# Patient Record
Sex: FEMALE | Race: WHITE | NOT HISPANIC OR LATINO | Employment: UNEMPLOYED | URBAN - METROPOLITAN AREA
[De-identification: names, ages, dates, MRNs, and addresses within clinical notes are randomized per-mention and may not be internally consistent; named-entity substitution may affect disease eponyms.]

---

## 2020-01-02 ENCOUNTER — TELEPHONE (OUTPATIENT)
Dept: GASTROENTEROLOGY | Facility: CLINIC | Age: 50
End: 2020-01-02

## 2020-01-02 NOTE — LETTER
January 2, 2020    Lisa RICHARDS Vkiy  [address] [place] [address]             [facility]  [address], [address]   [facility] [address]  Phone: [phone]  Fax: [phone] Dear Ms. Salmon:    We have attempted to contact you to schedule a screening colonoscopy that was ordered by your doctor. Please contact the office to schedule at [phone].      If you have any questions or concerns, please don't hesitate to call.    Sincerely,        Valeria Kaba MA

## 2020-01-27 ENCOUNTER — OFFICE VISIT (OUTPATIENT)
Dept: ORTHOPEDICS | Facility: CLINIC | Age: 50
End: 2020-01-27
Payer: MEDICAID

## 2020-01-27 VITALS
HEIGHT: 67 IN | DIASTOLIC BLOOD PRESSURE: 106 MMHG | WEIGHT: 293 LBS | SYSTOLIC BLOOD PRESSURE: 146 MMHG | BODY MASS INDEX: 45.99 KG/M2

## 2020-01-27 DIAGNOSIS — M25.562 BILATERAL CHRONIC KNEE PAIN: Primary | ICD-10-CM

## 2020-01-27 DIAGNOSIS — M25.561 BILATERAL CHRONIC KNEE PAIN: Primary | ICD-10-CM

## 2020-01-27 DIAGNOSIS — Z87.39 PERSONAL HISTORY OF RHEUMATOID ARTHRITIS: ICD-10-CM

## 2020-01-27 DIAGNOSIS — M25.562 LEFT KNEE PAIN: Primary | ICD-10-CM

## 2020-01-27 DIAGNOSIS — M17.0 OSTEOARTHRITIS OF BOTH KNEES, UNSPECIFIED OSTEOARTHRITIS TYPE: ICD-10-CM

## 2020-01-27 DIAGNOSIS — G89.29 BILATERAL CHRONIC KNEE PAIN: Primary | ICD-10-CM

## 2020-01-27 PROCEDURE — 99999 PR PBB SHADOW E&M-EST. PATIENT-LVL II: ICD-10-PCS | Mod: PBBFAC,,, | Performed by: ORTHOPAEDIC SURGERY

## 2020-01-27 PROCEDURE — 99999 PR PBB SHADOW E&M-EST. PATIENT-LVL II: CPT | Mod: PBBFAC,,, | Performed by: ORTHOPAEDIC SURGERY

## 2020-01-27 PROCEDURE — 99204 OFFICE O/P NEW MOD 45 MIN: CPT | Mod: S$PBB,,, | Performed by: ORTHOPAEDIC SURGERY

## 2020-01-27 PROCEDURE — 99204 PR OFFICE/OUTPT VISIT, NEW, LEVL IV, 45-59 MIN: ICD-10-PCS | Mod: S$PBB,,, | Performed by: ORTHOPAEDIC SURGERY

## 2020-01-27 PROCEDURE — 99212 OFFICE O/P EST SF 10 MIN: CPT | Mod: PBBFAC,PN | Performed by: ORTHOPAEDIC SURGERY

## 2020-01-27 RX ORDER — DICLOFENAC SODIUM 75 MG/1
75 TABLET, DELAYED RELEASE ORAL 2 TIMES DAILY
Qty: 60 TABLET | Refills: 0 | Status: SHIPPED | OUTPATIENT
Start: 2020-01-27 | End: 2022-03-17

## 2020-01-27 NOTE — PROGRESS NOTES
CC: left knee pain    49 y.o. Female presents today for evaluation of her left knee pain. Patient reports she fell approximately three months ago. Patient states she was giving her bird water when it bit her causing her to spill the water. Patient states she then slipped on the water with her right leg and all of her weight went to her to left knee. Patient states she fell forward onto her knee and reports immediate and significant pain. Patient states she did not immediately seek care for this problem, but does admit to going to the [facility]. She states she had x-rays performed and these did not show any bony deformity. Patient admits to this problem waking her up from her sleep. When asked where her pain is located she gestures to the anterior aspect of her knee and reports the quality of her pain feels as if there is a grinding. Patient reports she is in the bariatric program at [facility] and has approximately 3-4 months until her surgery. Patient denies falls or trauma since her last visit.   How long: Patient reports she has been experiencing left knee pain for the past couple of years.   Symptoms recently worsened and have been intense for the last 3 months after the fall directly onto her knee.  What makes it better: Patient states she has been unable to find anything to improve her pain at this time.   What makes it worse: Patient reports increased pain sitting for a long period of time and standing for long periods of time.  Does it radiate: Denies radiating pain.   Attempted treatments: Patient has been taking Neurontin and flexeril regularly without any improvement. She has also tried multiple over-the-counter anti-inflammatories improvement.  Pain score: 8/10  Any mechanical symptoms: Patient reports she has been experiencing painful popping.   Feelings of instability: Patient confirms feelings of instability.   Affect on ADLs: Patient confirms this problem is affecting her ability to perform  ADLs.      REVIEW OF SYSTEMS:   Constitution: Patient denies fever, chills, night sweats, and weight changes.  Eyes: Patient denies eye pain or vision changes.  HENT: Patient denies headache, ear pain, sore throat, or nasal discharge.  CVS: Patient denies chest pain.  Lungs: Patient denies shortness of breath or cough.  Abd: Patient denies stomach pain, nausea, or vomiting.  Skin: Patient denies skin rash or itching.    Hematologic/Lymphatic: Patient denies easy bruising.   Musculoskeletal: Patient denies recent falls. See HPI.  Psych: Patient denies any current anxiety or nervousness.    PAST MEDICAL HISTORY:   Past Medical History:   Diagnosis Date    Arthritis     High cholesterol     Hypertension     Hyperthyroidism        PAST SURGICAL HISTORY:   Past Surgical History:   Procedure Laterality Date    CHOLECYSTECTOMY      HYSTERECTOMY         FAMILY HISTORY:   History reviewed. No pertinent family history.    SOCIAL HISTORY:   Social History     Socioeconomic History    Marital status:      Spouse name: Not on file    Number of children: Not on file    Years of education: Not on file    Highest education level: Not on file   Occupational History    Not on file   Social Needs    Financial resource strain: Not on file    Food insecurity:     Worry: Not on file     Inability: Not on file    Transportation needs:     Medical: Not on file     Non-medical: Not on file   Tobacco Use    Smoking status: Never Smoker   Substance and Sexual Activity    Alcohol use: No    Drug use: No    Sexual activity: Yes     Birth control/protection: Surgical   Lifestyle    Physical activity:     Days per week: Not on file     Minutes per session: Not on file    Stress: Not on file   Relationships    Social connections:     Talks on phone: Not on file     Gets together: Not on file     Attends Mandaeism service: Not on file     Active member of club or organization: Not on file     Attends meetings of clubs or  "organizations: Not on file     Relationship status: Not on file   Other Topics Concern    Not on file   Social History Narrative    Not on file       MEDICATIONS:     Current Outpatient Medications:     amlodipine (NORVASC) 5 MG tablet, Take 10 mg by mouth once daily. , Disp: , Rfl:     atorvastatin (LIPITOR) 40 MG tablet, Take 40 mg by mouth once daily., Disp: , Rfl:     buPROPion (WELLBUTRIN XL) 300 MG 24 hr tablet, Take 300 mg by mouth once daily., Disp: , Rfl:     citalopram (CELEXA) 10 MG tablet, Take 60 mg by mouth once daily., Disp: , Rfl:     diclofenac (VOLTAREN) 75 MG EC tablet, Take 1 tablet (75 mg total) by mouth 2 (two) times daily., Disp: 60 tablet, Rfl: 0    estradiol (ESTRING) 2 mg vaginal ring, Take 2 mg by mouth once daily. , Disp: , Rfl:     levothyroxine (SYNTHROID) 50 MCG tablet, Take 50 mcg by mouth once daily., Disp: , Rfl:     oxycodone-acetaminophen (PERCOCET)  mg per tablet, Take 1 tablet by mouth every 4 (four) hours as needed for Pain., Disp: 20 tablet, Rfl: 0    ALLERGIES:   Review of patient's allergies indicates:  No Known Allergies     PHYSICAL EXAMINATION:  BP (!) 146/106   Ht 5' 7" (1.702 m)   Wt (!) 155.6 kg (343 lb)   BMI 53.72 kg/m²   Vitals signs and nursing note have been reviewed.  General: In no acute distress, well developed, well nourished, no diaphoresis  Eyes: EOM full and smooth, no eye redness or discharge  HENT: normocephalic and atraumatic, neck supple, trachea midline, no nasal discharge, no external ear redness or discharge  Cardiovascular: 2+ and symmetric DP pulses bilaterally, no LE edema  Lungs: respirations non-labored, no conversational dyspnea   Abd: non-distended, no rigidity  MSK: no amputation or deformity, no swelling of extremities  Neuro: AAOx3, CN2-12 grossly intact  Skin: No rashes, warm and dry  Psychiatric: cooperative, pleasant, mood and affect appropriate for age    MUSCULOSKELETAL EXAM:    LEFT KNEE EXAMINATION   Affected side " is compared to contralateral knee     Observation:  No edema, erythema, ecchymosis, or effusion noted.  No muscle atrophy of the thighs and calves noted.  No obvious bony deformities noted.   Genu varum, left more pronounced than right. No recurvatum noted.    No tibial internal/external torsion.      Tenderness:  Patella - present   Lateral joint line - present  Quad tendon - none   Medial joint line - present  Patellar tendon - none   Medial plica - none  Tibial tubercle - none   Lateral plica - none  Pes anserine - none   MCL prox - none  Distal ITB - none   MCL distal - none  MFC - none    LCL prox - none  LFC - none    LCL distal - none  Tibia - none    Fibula - none    No obvious bursae, plicae, popliteal cysts, or tendon derangement palpated.          ROM:   Active extension to 0° on left without hyperextension, lag, or patellar J sign. Crepitus present with extension.   Active extension to 0° on right without hyperextension, lag, crepitus, or patellar J sign.   Active flexion to 135° on left and 135° on right.    Strength: (bilaterally)  Knee Flexion - 5/5 on left and 5/5 on right  Knee Extension - 5/5 on left and 5/5 on right  Hip Flexion - 5/5 on left and 5/5 on right  Hip Extension - 5/5 on left and 5/5 on right  Ankle dorsiflexion - 5/5 on left and 5/5 on right  Ankle Plantarflexion - 5/5 on left and 5/5 on right    Patellofemoral Exam:  Patellar ballottement - negative  Bulge sign - negative  Patellar grind - negative  No patellar laxity with medial and lateral translation   No apprehension with medial and lateral patellar translation.     Meniscus Testing:     Pain present with forced flexion and terminal extension on the left.  Jamess test - Negative for clicks, positive for pain  Bounce home test - negative    Ligament Testing:  Lachman's test - negative  No laxity with varus testing at 0 and 30 degrees.  No laxity with valgus testing at 0 and 30 degrees.    IT Band Testing:  Dels test -  negative   Noble Compression test - negative    Neurovascular Examination:    antalgic gait favoring the left leg  Sensation intact to light touch in the obturator, lateral/intermediate/medial/posterior femoral cutaneous, saphenous, and common peroneal nerves bilaterally.  Pulses intact at the DP and PT arteries bilaterally.    Capillary refill intact <2 seconds in all toes bilaterally.      IMAGIN. X-ray ordered due to left knee pain  2. X-ray images were reviewed personally by me and then directly with patient.  3. FINDINGS: X-ray images obtained demonstrate moderate right-sided and severe left-sided medial compartment joint space loss.  There is no fracture, dislocation, or bony erosion.  4. IMPRESSION: As above.      ASSESSMENT:      ICD-10-CM ICD-9-CM   1. Bilateral chronic knee pain M25.561 719.46    M25.562 338.29    G89.29    2. Osteoarthritis of both knees, unspecified osteoarthritis type M17.0 715.96   3. Personal history of rheumatoid arthritis Z87.39 V13.4       PLAN:  1-3. Bilateral knee pain/ osteoarthritis/ personal history of RA -     - Lisa admits to bilateral knee pain for years, but states that her left knee pain became intense approximately 3 months ago after a fall directly onto it.  She was seen in the emergency department shortly after and a fracture was ruled out.  She states that her pain is not improved even with taking multiple different anti-inflammatory medications.  She is currently taking Flexeril and Neurontin regularly with minimal improvement.    - XRs ordered in the office today and images were personally reviewed with the patient. See above for further detail.    - We reviewed the natural history of osteoarthritis and a multipronged treatment approach. We reviewed the importance of addressing three different aspects to best manage this condition. Controlling the intra-articular immune reaction through medications and/or injections, improving local stability through bracing  and/or injection, and improving functional stability through hip, core, and ankle strength, stability and mobility which may benefit from formal physical therapy.    - Discussed pptions today, including intra-articular CSI, oral anti-inflammatories, exercises, braces and she likes to proceed with medications and exercises.    - HEP for knee OA prescribed today. Handout provided, explained, and exercises were demonstrated as needed. Encouraged to do daily.    - Voltaren 75 mg b.i.d. For 2 weeks followed by as needed.      Future planning includes -   Formal bracing, intra-articular CSI (has not yet tried either), PT    All questions were answered to the best of my ability and all concerns were addressed at this time.    Follow up in 4-5 weeks for above, or sooner if needed.      This note is dictated using the M*Modal Fluency Direct word recognition program. There are word recognition mistakes that are occasionally missed on review.

## 2020-01-27 NOTE — LETTER
January 27, 2020      Mp Howard NP  [address]  [address]  [facility] [address]           [facility]  [address], [facility] [address]  [facility] [address]  Phone: [phone]  Fax: [phone]          Patient: Lisa Salmon   MR Number: [id number]   YOB: 1970   Date of Visit: 1/27/2020       Dear Mp Howard:    Thank you for referring Lisa Salmon to me for evaluation. Attached you will find relevant portions of my assessment and plan of care.    If you have questions, please do not hesitate to call me. I look forward to following Lisa Salmon along with you.    Sincerely,    Guillremo Guerin, DO    Enclosure  CC:  No Recipients    If you would like to receive this communication electronically, please contact [email or web address] or [phone] to request more information on [employer] access.    For providers and/or their staff who would like to refer a patient to [facility], please contact us through our one-stop-shop provider referral line, [facility], at [phone].    If you feel you have received this communication in error or would no longer like to receive these types of communications, please e-mail [email or web address]

## 2020-05-04 ENCOUNTER — TELEPHONE (OUTPATIENT)
Dept: ORTHOPEDICS | Facility: CLINIC | Age: 50
End: 2020-05-04

## 2020-05-04 NOTE — TELEPHONE ENCOUNTER
Contacted patient to see how here knee pain has been progressing over the past several weeks. Patient states her pain has not improved and she is interested in possibly having an MRI scheduled. I reported to the patient that we are now able to start scheduling patients for in-person clinic visits. Patient reports she would like to be scheduled in 6 weeks. I was able to schedule the patient for an appointment date and time she was in agreement to.    [phone]  Brenda Chilel MS, OTC  Clinical Assistant to Dr. Guillermo Guerin

## 2020-05-20 ENCOUNTER — TELEPHONE (OUTPATIENT)
Dept: GASTROENTEROLOGY | Facility: CLINIC | Age: 50
End: 2020-05-20

## 2020-06-15 ENCOUNTER — NURSE TRIAGE (OUTPATIENT)
Dept: ADMINISTRATIVE | Facility: CLINIC | Age: 50
End: 2020-06-15

## 2020-06-16 NOTE — TELEPHONE ENCOUNTER
Reason for Disposition   [1] Symptoms of COVID-19 (e.g., cough, fever, SOB, or others) AND [2] lives in an area with community spread   [1] COVID-19 infection suspected by caller or triager AND [2] mild symptoms (cough, fever, or others) AND [3] no complications or SOB    Additional Information   Negative: SEVERE difficulty breathing (e.g., struggling for each breath, speaks in single words)   Negative: Difficult to awaken or acting confused (e.g., disoriented, slurred speech)   Negative: Bluish (or gray) lips or face now   Negative: Shock suspected (e.g., cold/pale/clammy skin, too weak to stand, low BP, rapid pulse)   Negative: Sounds like a life-threatening emergency to the triager   Negative: SEVERE or constant chest pain or pressure (Exception: mild central chest pain, present only when coughing)   Negative: MODERATE difficulty breathing (e.g., speaks in phrases, SOB even at rest, pulse 100-120)   Negative: MILD difficulty breathing (e.g., minimal/no SOB at rest, SOB with walking, pulse <100)   Negative: Chest pain or pressure   Negative: Fever > 103 F (39.4 C)   Negative: [1] Fever > 101 F (38.3 C) AND [2] age > 60   Negative: [1] Fever > 100.0 F (37.8 C) AND [2] bedridden (e.g., nursing home patient, CVA, chronic illness, recovering from surgery)   Negative: HIGH RISK patient (e.g., age > 64 years, diabetes, heart or lung disease, weak immune system)   Negative: Fever present > 3 days (72 hours)   Negative: [1] Fever returns after gone for over 24 hours AND [2] symptoms worse or not improved   Negative: [1] Continuous (nonstop) coughing interferes with work or school AND [2] no improvement using cough treatment per protocol    Protocols used: CORONAVIRUS (COVID-19) EXPOSURE-A-AH, CORONAVIRUS (COVID-19) DIAGNOSED OR [id number]    Pt stated she has symptoms of COVID and wants to be tested. Reports low grade temp,cough, chills and loss of smell. Pt advised to see a Physician when office  opens. Advised of COVID testing site locations. Advised Provider at [facility] determines if a test will be administered. Pt verbalized understanding.

## 2021-05-07 ENCOUNTER — HOSPITAL ENCOUNTER (EMERGENCY)
Facility: HOSPITAL | Age: 51
Discharge: HOME | End: 2021-05-07
Attending: EMERGENCY MEDICINE
Payer: MEDICAID

## 2021-05-07 VITALS
SYSTOLIC BLOOD PRESSURE: 127 MMHG | OXYGEN SATURATION: 100 % | RESPIRATION RATE: 22 BRPM | WEIGHT: 293 LBS | DIASTOLIC BLOOD PRESSURE: 80 MMHG | BODY MASS INDEX: 45.99 KG/M2 | TEMPERATURE: 99 F | HEIGHT: 67 IN | HEART RATE: 98 BPM

## 2021-05-07 DIAGNOSIS — R53.83 FATIGUE, UNSPECIFIED TYPE: ICD-10-CM

## 2021-05-07 DIAGNOSIS — R74.01 TRANSAMINITIS: Primary | ICD-10-CM

## 2021-05-07 DIAGNOSIS — B34.9 VIRAL SYNDROME: ICD-10-CM

## 2021-05-07 LAB
ALBUMIN SERPL BCP-MCNC: 3.1 G/DL (ref 3.5–5.2)
ALP SERPL-CCNC: 260 U/L (ref 55–135)
ALT SERPL W/O P-5'-P-CCNC: 71 U/L (ref 10–44)
ANION GAP SERPL CALC-SCNC: 11 MMOL/L (ref 8–16)
APAP SERPL-MCNC: <3 UG/ML (ref 10–20)
AST SERPL-CCNC: 68 U/L (ref 10–40)
BILIRUB SERPL-MCNC: 0.7 MG/DL (ref 0.1–1)
BUN SERPL-MCNC: 12 MG/DL (ref 6–20)
CALCIUM SERPL-MCNC: 8 MG/DL (ref 8.7–10.5)
CHLORIDE SERPL-SCNC: 104 MMOL/L (ref 95–110)
CO2 SERPL-SCNC: 25 MMOL/L (ref 23–29)
CREAT SERPL-MCNC: 1.1 MG/DL (ref 0.5–1.4)
CTP QC/QA: YES
ERYTHROCYTE [DISTWIDTH] IN BLOOD BY AUTOMATED COUNT: 14.6 % (ref 11.5–14.5)
EST. GFR  (AFRICAN AMERICAN): >60 ML/MIN/1.73 M^2
EST. GFR  (NON AFRICAN AMERICAN): 58 ML/MIN/1.73 M^2
GLUCOSE SERPL-MCNC: 96 MG/DL (ref 70–110)
HCT VFR BLD AUTO: 39.4 % (ref 37–48.5)
HETEROPH AB SERPL QL IA: NEGATIVE
HGB BLD-MCNC: 13.3 G/DL (ref 12–16)
INR PPP: 1 (ref 0.8–1.2)
MCH RBC QN AUTO: 29.8 PG (ref 27–31)
MCHC RBC AUTO-ENTMCNC: 33.8 G/DL (ref 32–36)
MCV RBC AUTO: 88 FL (ref 82–98)
PLATELET # BLD AUTO: 169 K/UL (ref 150–450)
PMV BLD AUTO: 9.5 FL (ref 9.2–12.9)
POC MOLECULAR INFLUENZA A AGN: NEGATIVE
POC MOLECULAR INFLUENZA B AGN: NEGATIVE
POTASSIUM SERPL-SCNC: 3.8 MMOL/L (ref 3.5–5.1)
PROT SERPL-MCNC: 6.4 G/DL (ref 6–8.4)
PROTHROMBIN TIME: 10.7 SEC (ref 9–12.5)
RBC # BLD AUTO: 4.47 M/UL (ref 4–5.4)
SODIUM SERPL-SCNC: 140 MMOL/L (ref 136–145)
WBC # BLD AUTO: 7.32 K/UL (ref 3.9–12.7)

## 2021-05-07 PROCEDURE — 85027 COMPLETE CBC AUTOMATED: CPT | Performed by: EMERGENCY MEDICINE

## 2021-05-07 PROCEDURE — 86308 HETEROPHILE ANTIBODY SCREEN: CPT | Performed by: EMERGENCY MEDICINE

## 2021-05-07 PROCEDURE — 99284 EMERGENCY DEPT VISIT MOD MDM: CPT | Mod: 25

## 2021-05-07 PROCEDURE — 80053 COMPREHEN METABOLIC PANEL: CPT | Performed by: EMERGENCY MEDICINE

## 2021-05-07 PROCEDURE — 80074 ACUTE HEPATITIS PANEL: CPT | Performed by: EMERGENCY MEDICINE

## 2021-05-07 PROCEDURE — 80143 DRUG ASSAY ACETAMINOPHEN: CPT | Performed by: EMERGENCY MEDICINE

## 2021-05-07 PROCEDURE — 85610 PROTHROMBIN TIME: CPT | Performed by: EMERGENCY MEDICINE

## 2021-05-10 LAB
HAV IGM SERPL QL IA: NEGATIVE
HBV CORE IGM SERPL QL IA: NEGATIVE
HBV SURFACE AG SERPL QL IA: NEGATIVE
HCV AB SERPL QL IA: NEGATIVE

## 2021-05-23 ENCOUNTER — HOSPITAL ENCOUNTER (EMERGENCY)
Facility: HOSPITAL | Age: 51
Discharge: HOME | End: 2021-05-23
Attending: EMERGENCY MEDICINE
Payer: MEDICAID

## 2021-05-23 VITALS
HEART RATE: 92 BPM | OXYGEN SATURATION: 96 % | DIASTOLIC BLOOD PRESSURE: 87 MMHG | SYSTOLIC BLOOD PRESSURE: 140 MMHG | RESPIRATION RATE: 18 BRPM | TEMPERATURE: 98 F

## 2021-05-23 DIAGNOSIS — R11.2 NAUSEA & VOMITING: Primary | ICD-10-CM

## 2021-05-23 DIAGNOSIS — K13.0 ANGULAR CHEILITIS: ICD-10-CM

## 2021-05-23 LAB
ALBUMIN SERPL BCP-MCNC: 2.8 G/DL (ref 3.5–5.2)
ALP SERPL-CCNC: 217 U/L (ref 55–135)
ALT SERPL W/O P-5'-P-CCNC: 72 U/L (ref 10–44)
ANION GAP SERPL CALC-SCNC: 11 MMOL/L (ref 8–16)
AST SERPL-CCNC: 50 U/L (ref 10–40)
BASOPHILS NFR BLD: 0 % (ref 0–1.9)
BILIRUB SERPL-MCNC: 0.4 MG/DL (ref 0.1–1)
BUN SERPL-MCNC: 13 MG/DL (ref 6–20)
CALCIUM SERPL-MCNC: 8 MG/DL (ref 8.7–10.5)
CHLORIDE SERPL-SCNC: 107 MMOL/L (ref 95–110)
CO2 SERPL-SCNC: 17 MMOL/L (ref 23–29)
CREAT SERPL-MCNC: 1.1 MG/DL (ref 0.5–1.4)
DIFFERENTIAL METHOD: ABNORMAL
EOSINOPHIL NFR BLD: 2 % (ref 0–8)
ERYTHROCYTE [DISTWIDTH] IN BLOOD BY AUTOMATED COUNT: 16.1 % (ref 11.5–14.5)
EST. GFR  (AFRICAN AMERICAN): >60 ML/MIN/1.73 M^2
EST. GFR  (NON AFRICAN AMERICAN): 58.3 ML/MIN/1.73 M^2
GLUCOSE SERPL-MCNC: 139 MG/DL (ref 70–110)
HCT VFR BLD AUTO: 43.3 % (ref 37–48.5)
HGB BLD-MCNC: 12.9 G/DL (ref 12–16)
IMM GRANULOCYTES # BLD AUTO: ABNORMAL K/UL (ref 0–0.04)
IMM GRANULOCYTES NFR BLD AUTO: ABNORMAL % (ref 0–0.5)
LIPASE SERPL-CCNC: 33 U/L (ref 4–60)
LYMPHOCYTES NFR BLD: 31 % (ref 18–48)
MCH RBC QN AUTO: 28.5 PG (ref 27–31)
MCHC RBC AUTO-ENTMCNC: 29.8 G/DL (ref 32–36)
MCV RBC AUTO: 96 FL (ref 82–98)
MONOCYTES NFR BLD: 10 % (ref 4–15)
NEUTROPHILS NFR BLD: 55 % (ref 38–73)
NEUTS BAND NFR BLD MANUAL: 2 %
NRBC BLD-RTO: 0 /100 WBC
PLATELET # BLD AUTO: 267 K/UL (ref 150–450)
PLATELET BLD QL SMEAR: ABNORMAL
PMV BLD AUTO: 9.2 FL (ref 9.2–12.9)
POTASSIUM SERPL-SCNC: 4.3 MMOL/L (ref 3.5–5.1)
PROT SERPL-MCNC: 6.8 G/DL (ref 6–8.4)
RBC # BLD AUTO: 4.52 M/UL (ref 4–5.4)
SODIUM SERPL-SCNC: 135 MMOL/L (ref 136–145)
WBC # BLD AUTO: 14.56 K/UL (ref 3.9–12.7)

## 2021-05-23 PROCEDURE — 85060 BLOOD SMEAR INTERPRETATION: CPT | Mod: ,,, | Performed by: PATHOLOGY

## 2021-05-23 PROCEDURE — 99284 EMERGENCY DEPT VISIT MOD MDM: CPT | Mod: ,,, | Performed by: EMERGENCY MEDICINE

## 2021-05-23 PROCEDURE — 80053 COMPREHEN METABOLIC PANEL: CPT | Performed by: STUDENT IN AN ORGANIZED HEALTH CARE EDUCATION/TRAINING PROGRAM

## 2021-05-23 PROCEDURE — 99284 EMERGENCY DEPT VISIT MOD MDM: CPT | Mod: 25

## 2021-05-23 PROCEDURE — 96360 HYDRATION IV INFUSION INIT: CPT

## 2021-05-23 PROCEDURE — 85007 BL SMEAR W/DIFF WBC COUNT: CPT | Performed by: STUDENT IN AN ORGANIZED HEALTH CARE EDUCATION/TRAINING PROGRAM

## 2021-05-23 PROCEDURE — 93010 EKG 12-LEAD: ICD-10-PCS | Mod: ,,, | Performed by: INTERNAL MEDICINE

## 2021-05-23 PROCEDURE — 93005 ELECTROCARDIOGRAM TRACING: CPT

## 2021-05-23 PROCEDURE — 93010 ELECTROCARDIOGRAM REPORT: CPT | Mod: ,,, | Performed by: INTERNAL MEDICINE

## 2021-05-23 PROCEDURE — 85060 PATHOLOGIST REVIEW: ICD-10-PCS | Mod: ,,, | Performed by: PATHOLOGY

## 2021-05-23 PROCEDURE — 25000003 PHARM REV CODE 250: Performed by: STUDENT IN AN ORGANIZED HEALTH CARE EDUCATION/TRAINING PROGRAM

## 2021-05-23 PROCEDURE — 85027 COMPLETE CBC AUTOMATED: CPT | Performed by: STUDENT IN AN ORGANIZED HEALTH CARE EDUCATION/TRAINING PROGRAM

## 2021-05-23 PROCEDURE — 96372 THER/PROPH/DIAG INJ SC/IM: CPT | Mod: 59

## 2021-05-23 PROCEDURE — 99284 PR EMERGENCY DEPT VISIT,LEVEL IV: ICD-10-PCS | Mod: ,,, | Performed by: EMERGENCY MEDICINE

## 2021-05-23 PROCEDURE — 63600175 PHARM REV CODE 636 W HCPCS: Performed by: STUDENT IN AN ORGANIZED HEALTH CARE EDUCATION/TRAINING PROGRAM

## 2021-05-23 PROCEDURE — 83690 ASSAY OF LIPASE: CPT | Performed by: STUDENT IN AN ORGANIZED HEALTH CARE EDUCATION/TRAINING PROGRAM

## 2021-05-23 RX ORDER — THIAMINE HYDROCHLORIDE 100 MG/ML
100 INJECTION, SOLUTION INTRAMUSCULAR; INTRAVENOUS
Status: COMPLETED | OUTPATIENT
Start: 2021-05-23 | End: 2021-05-23

## 2021-05-23 RX ADMIN — THIAMINE HYDROCHLORIDE 100 MG: 100 INJECTION, SOLUTION INTRAMUSCULAR; INTRAVENOUS at 04:05

## 2021-05-23 RX ADMIN — SODIUM CHLORIDE 1000 ML: 0.9 INJECTION, SOLUTION INTRAVENOUS at 04:05

## 2021-05-24 ENCOUNTER — TELEPHONE (OUTPATIENT)
Dept: BARIATRICS | Facility: CLINIC | Age: 51
End: 2021-05-24

## 2021-05-24 LAB — PATH REV BLD -IMP: NORMAL

## 2022-03-16 DIAGNOSIS — M25.562 LEFT KNEE PAIN, UNSPECIFIED CHRONICITY: Primary | ICD-10-CM

## 2022-03-17 ENCOUNTER — OFFICE VISIT (OUTPATIENT)
Dept: ORTHOPEDICS | Facility: CLINIC | Age: 52
End: 2022-03-17
Payer: MEDICAID

## 2022-03-17 DIAGNOSIS — M17.12 PRIMARY OSTEOARTHRITIS OF LEFT KNEE: Primary | ICD-10-CM

## 2022-03-17 DIAGNOSIS — M25.569 KNEE PAIN, UNSPECIFIED CHRONICITY, UNSPECIFIED LATERALITY: Primary | ICD-10-CM

## 2022-03-17 PROCEDURE — 99999 PR PBB SHADOW E&M-EST. PATIENT-LVL III: CPT | Mod: PBBFAC,,, | Performed by: PHYSICIAN ASSISTANT

## 2022-03-17 PROCEDURE — 99213 OFFICE O/P EST LOW 20 MIN: CPT | Mod: PBBFAC,PN,25 | Performed by: PHYSICIAN ASSISTANT

## 2022-03-17 PROCEDURE — 99213 OFFICE O/P EST LOW 20 MIN: CPT | Mod: 25,S$PBB,, | Performed by: PHYSICIAN ASSISTANT

## 2022-03-17 PROCEDURE — 99999 PR PBB SHADOW E&M-EST. PATIENT-LVL III: ICD-10-PCS | Mod: PBBFAC,,, | Performed by: PHYSICIAN ASSISTANT

## 2022-03-17 PROCEDURE — 1160F RVW MEDS BY RX/DR IN RCRD: CPT | Mod: CPTII,,, | Performed by: PHYSICIAN ASSISTANT

## 2022-03-17 PROCEDURE — 20610 DRAIN/INJ JOINT/BURSA W/O US: CPT | Mod: S$PBB,LT,, | Performed by: PHYSICIAN ASSISTANT

## 2022-03-17 PROCEDURE — 1160F PR REVIEW ALL MEDS BY PRESCRIBER/CLIN PHARMACIST DOCUMENTED: ICD-10-PCS | Mod: CPTII,,, | Performed by: PHYSICIAN ASSISTANT

## 2022-03-17 PROCEDURE — 20610 DRAIN/INJ JOINT/BURSA W/O US: CPT | Mod: PBBFAC,PN | Performed by: PHYSICIAN ASSISTANT

## 2022-03-17 PROCEDURE — 20610 LARGE JOINT ASPIRATION/INJECTION: L KNEE: ICD-10-PCS | Mod: S$PBB,LT,, | Performed by: PHYSICIAN ASSISTANT

## 2022-03-17 PROCEDURE — 1159F PR MEDICATION LIST DOCUMENTED IN MEDICAL RECORD: ICD-10-PCS | Mod: CPTII,,, | Performed by: PHYSICIAN ASSISTANT

## 2022-03-17 PROCEDURE — 99213 PR OFFICE/OUTPT VISIT, EST, LEVL III, 20-29 MIN: ICD-10-PCS | Mod: 25,S$PBB,, | Performed by: PHYSICIAN ASSISTANT

## 2022-03-17 PROCEDURE — 1159F MED LIST DOCD IN RCRD: CPT | Mod: CPTII,,, | Performed by: PHYSICIAN ASSISTANT

## 2022-03-17 RX ORDER — TRIAMCINOLONE ACETONIDE 40 MG/ML
40 INJECTION, SUSPENSION INTRA-ARTICULAR; INTRAMUSCULAR
Status: DISCONTINUED | OUTPATIENT
Start: 2022-03-17 | End: 2022-03-17 | Stop reason: HOSPADM

## 2022-03-17 RX ORDER — ERGOCALCIFEROL 1.25 MG/1
50000 CAPSULE ORAL
COMMUNITY
Start: 2022-03-10

## 2022-03-17 RX ORDER — CLONAZEPAM 1 MG/1
1 TABLET ORAL 3 TIMES DAILY PRN
COMMUNITY
Start: 2022-02-17

## 2022-03-17 RX ORDER — LISDEXAMFETAMINE DIMESYLATE 40 MG/1
1 CAPSULE ORAL DAILY
COMMUNITY
Start: 2022-01-19 | End: 2023-05-23

## 2022-03-17 RX ORDER — GABAPENTIN 400 MG/1
400 CAPSULE ORAL 3 TIMES DAILY
COMMUNITY
End: 2023-08-01

## 2022-03-17 RX ORDER — FERROUS SULFATE TAB 325 MG (65 MG ELEMENTAL FE) 325 (65 FE) MG
TAB ORAL DAILY
COMMUNITY
Start: 2022-03-10 | End: 2022-04-06

## 2022-03-17 RX ORDER — ARIPIPRAZOLE 15 MG/1
15 TABLET ORAL DAILY
COMMUNITY
Start: 2022-02-18

## 2022-03-17 RX ORDER — FOLIC ACID 1 MG/1
1000 TABLET ORAL DAILY
COMMUNITY
Start: 2022-03-03

## 2022-03-17 RX ADMIN — TRIAMCINOLONE ACETONIDE 40 MG: 40 INJECTION, SUSPENSION INTRA-ARTICULAR; INTRAMUSCULAR at 08:03

## 2022-03-17 NOTE — PATIENT INSTRUCTIONS
It was nice to meet you today! I am sorry that you are hurting so much.   You have some wear and tear in your left knee (arthritis) and this is likely what is causing your pain.   The injection that I did today should give you some good relief of pain. It is normal to have some increased soreness over the next few days after an injection. Put ice on it and elevate. This will get better.   Wear the knee brace as needed for prolonged walking. This should give you added support and help with pain.   Work on weight loss- this will help with knee pain.  I will see you back in 3 months, but please stay in touch and call me if you need anything. You can also send me a message in [facility].   Ross[place]   [phone]       Patient Education       Preventing Falls in Adult   About this topic   A fall is the sudden loss of balance that causes a person to drop to the ground or floor. Falls are a serious health risk and they happen more often as we get older. Many things may increase your risk of falling, like:  Problems that come with getting older  Muscle weakness  Balance problems  More trouble seeing  Personal health factors  Health conditions such as arthritis, Parkinson's disease, low blood pressure, or stroke  Medicines you take  Loss of feeling in your feet  Being less active  Taking drugs that makes you dizzy or drowsy  Habits like alcohol use  Things around your house  Slippery floors  Unsecured rugs  Stairs  Wearing improper fitting shoes  Areas where it is dark and difficult to see  Incorrect size or type of assistive devices  Clutter and items on the floor that block your walkway     What will the results be?   Prevent future falls  Avoid injuries and disabilities  Improve overall health  Will there be any other care needed?   Ask your doctor if you need to take vitamin D to help keep your bones strong.  Make your home safer. Get rid of things that might make you trip or slip. These are things like loose rugs,  electrical cords, or clutter. Add grab bars, a shower seat, and handrails.  Wear sturdy shoes that fit well. Shoes should fit well, have a low heel, and the soles of the shoe should not be slippery. Walking in socks or with bare feet can raise your chance for falling.  Stay active. Walk, garden, swim, or do something active on a regular basis. These activities may prevent you from getting hurt if you do fall. They also help with your strength and balance.  Use a cane, walker, or other safety device. Be sure it is the right size for you and that you know how to use it safely. Be sure to wear your eyeglasses if they have been ordered for you.  Get up slowly after you sit or lie down. Try to change positions slowly.  What to do if you fall:  Stay calm and do not panic.  Look for signs to decide if you have been hurt or have an injury.  If you think you can get up safely, try to get up.  If you are hurt or cannot get up on your own, try to get help.  If no one is available to help, try to get comfortable and wait for someone to arrive who can help you.  Stay warm and move regularly as you are able. Avoid putting too much pressure on any one area.  After a fall, tell family and friends that you have fallen. It is also important to talk to your doctor about your fall right away.  What problems could happen?   A fall can lead to broken bones and other serious injuries in older adults. Problems that happen because of a fall may even lead to death in older adults. Many people are not able to return to their former level of activity after a fall.  What can be done to prevent this health problem?   Lower Your Risk of Falling  Wear your eyeglasses. Have regular eye checkups. Do not use reading glasses when you walk around.  Quit smoking and limit alcohol intake. Smoking and too much alcohol can decrease bone mass and increase the chance of broken bones.  Know the side effects of the drugs you are taking. Some drugs may affect  your balance and cause confusion or sleepiness.  Get up slowly after you sit or lie down. Do not change positions quickly. Do not rush when you need to go to the bathroom or to answer the phone.  Stay Physically Active  Be physically active. This will help to improve your strength and balance.  Fear of falling may lead you to avoid activities. Talk to your doctor. You may be sent to a physical therapist. This person can help you improve balance and build your confidence. Getting rid of your fear of falling can help you stay active and prevent future falls.  Join an exercise program. Ask your doctor what exercise is safe for you. Be sure to ask before you do any exercises, especially if you have illnesses like arthritis. Exercise can help you keep muscles strong and help with your balance. It is also a good way to learn proper ways to do each activity or exercise.  Safety Tips at Home  Keep your floors and walking areas clear from clutter. Remove furniture that blocks your way. Secure cords and wires near the wall to avoid tripping over them. Get rid of throw rugs.  Be sure the lights in your house are working well and provide good lighting throughout your home. Make sure you can reach switches and lamps easily. Place a lamp close to your bed that is easy to reach.  Fix all steps and sidewalks to make them smooth and even. Put handrails and lights on stairs.  Keep all the things you use often on low shelves or in cabinets that are at about waist level. Ask for help to move items off of high shelves. Do not use a chair as a step stool.  Keep your bathroom area safe. Use nonslip rubber mats on the floor and in the tub or shower.  Keep a phone near you in case of emergency. Keep a list of your emergency contact numbers in large print near your phone. Carry a phone with you when you go for a walk. Consider using a personal alarm device that could call for help in case you fall and cannot get up.  Think about protecting  your hip. Hip protectors may be needed if you have a higher chance for falling. Ask your doctor about this.  Where can I learn more?   American Academy of [employer] Physicians  https://familydoctor.org/nrkni-egn-hg-lower-your-risk/   NHS  https://www.nhs.uk/conditions/falls/prevention/   Last Reviewed Date   2021-06-07  Consumer Information Use and Disclaimer   This information is not specific medical advice and does not replace information you receive from your health care provider. This is only a brief summary of general information. It does NOT include all information about conditions, illnesses, injuries, tests, procedures, treatments, therapies, discharge instructions or life-style choices that may apply to you. You must talk with your health care provider for complete information about your health and treatment options. This information should not be used to decide whether or not to accept your health care providers advice, instructions or recommendations. Only your health care provider has the knowledge and training to provide advice that is right for you.  Copyright   Copyright © 2021 [employer] and its affiliates and/or licensors. All rights reserved.

## 2022-03-17 NOTE — PROGRESS NOTES
Subjective:      Patient ID: Lisa Salmon is a 51 y.o. female.    Chief Complaint: Pain of the Left Knee      HPI  (Pebbles)    History of bariatric surgery.      Last seen by Dr. Guerin on 1/27/20 for bilateral knee pain. She was given HEP and voltaren.     She has constant left knee pain x years. No specific aggravating factors- it hurts all the time. No right knee pain. She has giving way with popping. No locking or catching. She rates her pain as a 10 on a scale of 1-10. Pain is sharp and aching.     No injections, bracing, or surgery on her knee. Unable to take NSAIDs due to previous bariatric surgery. She is taking neurontin and thinks this helps.       Past Medical History:   Diagnosis Date    Arthritis     High cholesterol     Hypertension     Hyperthyroidism          Current Outpatient Medications:     amlodipine (NORVASC) 5 MG tablet, Take 10 mg by mouth once daily. , Disp: , Rfl:     ARIPiprazole (ABILIFY) 15 MG Tab, Take 15 mg by mouth once daily., Disp: , Rfl:     buPROPion (WELLBUTRIN XL) 300 MG 24 hr tablet, Take 300 mg by mouth once daily., Disp: , Rfl:     citalopram (CELEXA) 10 MG tablet, Take 60 mg by mouth once daily., Disp: , Rfl:     clonazePAM (KLONOPIN) 1 MG tablet, Take 1 mg by mouth 3 (three) times daily as needed., Disp: , Rfl:     estradiol (ESTRING) 2 mg vaginal ring, Take 2 mg by mouth once daily. , Disp: , Rfl:     FEROSUL 325 mg (65 mg iron) Tab tablet, Take by mouth once daily., Disp: , Rfl:     folic acid (FOLVITE) 1 MG tablet, Take 1,000 mcg by mouth once daily., Disp: , Rfl:     gabapentin (NEURONTIN) 400 MG capsule, Take 400 mg by mouth 3 (three) times daily., Disp: , Rfl:     levothyroxine (SYNTHROID) 50 MCG tablet, Take 50 mcg by mouth once daily., Disp: , Rfl:     NON FORMULARY MEDICATION, B12 plus D3/K2 Patch Multivitamin Patch, Disp: , Rfl:     VITAMIN D2 1,250 mcg (50,000 unit) capsule, Take 50,000 Units by mouth every 7 days., Disp: , Rfl:     VYVANSE 40 mg  Cap, Take 1 capsule by mouth once daily., Disp: , Rfl:     Review of patient's allergies indicates:   Allergen Reactions    Nsaids (non-steroidal anti-inflammatory drug)      Can't not take due to bariatric surgery        Review of Systems   Constitutional: Negative for chills, fever, night sweats and weight gain.   Gastrointestinal: Negative for bowel incontinence, nausea and vomiting.   Genitourinary: Negative for bladder incontinence.   Neurological: Negative for disturbances in coordination and loss of balance.         Objective:        There were no vitals taken for this visit.    Ortho/SPM Exam    Body habitus is obese.   The patient walks with a limp.      RIGHT KNEE EXAM:  Resisted SLR negative.   The skin over the knee is intact.  Knee effusion not obvious   Tendernes is located n/a  Range of motion- Flexion full, Extension full,     Ligament exam:   MCL intact   Lachman intact              Post sag intact    LCL intact    Patellar apprehension negative.  Popliteal cyst negative  Patellar crepitation absent.  Flexion/pinch negative.    Motor normal 5/5 strength in all tested muscle groups.   Sensory normal.      LEFT KNEE EXAM:    Resisted SLR negative.   The skin over the knee is intact.  Knee effusion not obvious   Tendernes is located medial and lateral  Range of motion- Flexion 100 deg, Extension 5 deg,     Ligament exam:   MCL 1+ laxity   Lachman intact              Post sag intact    LCL intact    Patellar apprehension negative.  Popliteal cyst negative  Patellar crepitation absent.  Flexion/pinch negative.    Motor normal 5/5 strength in all tested muscle groups.   Sensory normal.      XRAY INTERPRETATION:  X-rays of bilateral knees dated 3/17/22 are personally reviewed and show moderate right medial joint space narrowing, moderate/severe left medial joint space narrowing with left patellofemoral arthritis.         Assessment:       Encounter Diagnosis   Name Primary?    Primary osteoarthritis of  left knee Yes          Plan:       Lisa was seen today for pain.    Diagnoses and all orders for this visit:    Primary osteoarthritis of left knee  -     Large Joint Aspiration/Injection: L knee      She has constant left knee pain x years. No right knee pain. She has giving way with popping. No locking or catching.    Known  moderate right medial joint space narrowing, moderate/severe left medial joint space narrowing with left patellofemoral arthritis.     Treatment options reviewed with patient along with above bilateral knee xrays. Following plan made:     - LEFT knee injection done without complication. See procedure note.   - Unable to take NSAIDs due to gastric bypass.   - Continue on neurontin from PCP.  - Weight loss recommended and discussed.   - Can repeat injection every 3 months if helpful.     Follow up in about 3 months (around 6/17/2022).

## 2022-04-06 ENCOUNTER — OFFICE VISIT (OUTPATIENT)
Dept: ORTHOPEDICS | Facility: CLINIC | Age: 52
End: 2022-04-06
Payer: MEDICAID

## 2022-04-06 ENCOUNTER — PATIENT MESSAGE (OUTPATIENT)
Dept: ORTHOPEDICS | Facility: CLINIC | Age: 52
End: 2022-04-06
Payer: MEDICAID

## 2022-04-06 ENCOUNTER — TELEPHONE (OUTPATIENT)
Dept: ORTHOPEDICS | Facility: CLINIC | Age: 52
End: 2022-04-06
Payer: MEDICAID

## 2022-04-06 VITALS — HEIGHT: 67 IN | WEIGHT: 293 LBS | BODY MASS INDEX: 45.99 KG/M2

## 2022-04-06 DIAGNOSIS — M17.11 PRIMARY OSTEOARTHRITIS OF RIGHT KNEE: Primary | ICD-10-CM

## 2022-04-06 PROCEDURE — 1160F RVW MEDS BY RX/DR IN RCRD: CPT | Mod: CPTII,,, | Performed by: PHYSICIAN ASSISTANT

## 2022-04-06 PROCEDURE — 20610 LARGE JOINT ASPIRATION/INJECTION: R KNEE: ICD-10-PCS | Mod: S$PBB,RT,, | Performed by: PHYSICIAN ASSISTANT

## 2022-04-06 PROCEDURE — 1159F MED LIST DOCD IN RCRD: CPT | Mod: CPTII,,, | Performed by: PHYSICIAN ASSISTANT

## 2022-04-06 PROCEDURE — 99213 OFFICE O/P EST LOW 20 MIN: CPT | Mod: PBBFAC,PN | Performed by: PHYSICIAN ASSISTANT

## 2022-04-06 PROCEDURE — 1160F PR REVIEW ALL MEDS BY PRESCRIBER/CLIN PHARMACIST DOCUMENTED: ICD-10-PCS | Mod: CPTII,,, | Performed by: PHYSICIAN ASSISTANT

## 2022-04-06 PROCEDURE — 99213 OFFICE O/P EST LOW 20 MIN: CPT | Mod: 25,S$PBB,, | Performed by: PHYSICIAN ASSISTANT

## 2022-04-06 PROCEDURE — 99999 PR PBB SHADOW E&M-EST. PATIENT-LVL III: ICD-10-PCS | Mod: PBBFAC,,, | Performed by: PHYSICIAN ASSISTANT

## 2022-04-06 PROCEDURE — 1159F PR MEDICATION LIST DOCUMENTED IN MEDICAL RECORD: ICD-10-PCS | Mod: CPTII,,, | Performed by: PHYSICIAN ASSISTANT

## 2022-04-06 PROCEDURE — 99999 PR PBB SHADOW E&M-EST. PATIENT-LVL III: CPT | Mod: PBBFAC,,, | Performed by: PHYSICIAN ASSISTANT

## 2022-04-06 PROCEDURE — 20610 DRAIN/INJ JOINT/BURSA W/O US: CPT | Mod: RT,PBBFAC,PN | Performed by: PHYSICIAN ASSISTANT

## 2022-04-06 PROCEDURE — 99213 PR OFFICE/OUTPT VISIT, EST, LEVL III, 20-29 MIN: ICD-10-PCS | Mod: 25,S$PBB,, | Performed by: PHYSICIAN ASSISTANT

## 2022-04-06 PROCEDURE — 3008F PR BODY MASS INDEX (BMI) DOCUMENTED: ICD-10-PCS | Mod: CPTII,,, | Performed by: PHYSICIAN ASSISTANT

## 2022-04-06 PROCEDURE — 3008F BODY MASS INDEX DOCD: CPT | Mod: CPTII,,, | Performed by: PHYSICIAN ASSISTANT

## 2022-04-06 PROCEDURE — 20610 DRAIN/INJ JOINT/BURSA W/O US: CPT | Mod: S$PBB,RT,, | Performed by: PHYSICIAN ASSISTANT

## 2022-04-06 RX ORDER — TRIAMCINOLONE ACETONIDE 40 MG/ML
40 INJECTION, SUSPENSION INTRA-ARTICULAR; INTRAMUSCULAR
Status: DISCONTINUED | OUTPATIENT
Start: 2022-04-06 | End: 2022-04-06 | Stop reason: HOSPADM

## 2022-04-06 RX ADMIN — TRIAMCINOLONE ACETONIDE 40 MG: 40 INJECTION, SUSPENSION INTRA-ARTICULAR; INTRAMUSCULAR at 10:04

## 2022-04-06 NOTE — PROCEDURES
Large Joint Aspiration/Injection: R knee    Date/Time: 4/6/2022 10:30 AM  Performed by: Rossy Camacho PA-C  Authorized by: Rossy Camacho PA-C     Consent Done?:  Yes (Verbal)  Timeout: prior to procedure the correct patient, procedure, and site was verified    Location:  Knee  Site:  R knee  Medications:  40 mg triamcinolone acetonide 40 mg/mL     PROCEDURE NOTE:  RIGHT KNEE INJECTION    I have explained the risks, benefits, and alternatives of the procedure in detail.  The patient voices understanding and all questions have been answered.  The patient agrees to proceed as planned.    After a sterile prep of the skin using chloraprep one step, the area was sprayed with local topical anesthetic and then cleaned with alcohol. The RIGHT knee was injected through an inferior lateral approach with a combination of 2 cc 1% plain xylocaine and 40mg triamcinolone.  The patient is cautioned that immediate relief of pain is secondary to the local anesthetic and will be temporary. After the anesthetic wears off there may be a increase in pain that may last for a few hours or a few days and they should use ice to help alleviate this this pain.     If patient is diabetic, post injection elevation of blood sugar was discussed. Patient is to check blood sugar regularly and call PCP with any issues.     Patient tolerated the procedure well.

## 2022-04-06 NOTE — PROGRESS NOTES
Subjective:      Patient ID: Lisa Salmon is a 51 y.o. female.    Chief Complaint: Pain of the Right Knee      HPI  (Pebbles)    History of bariatric surgery.      Last seen by me on 3/17/22 for left knee pain. No right knee pain at that time. Known moderate right medial joint space narrowing, moderate/severe left medial joint space narrowing with left patellofemoral arthritis.     She had LEFT knee injection on 3/17/22. Unable to take NSAIDs due to history of gastric bypass. Weight loss recommended as well.     She is here for follow up.     She did great with left knee injection and had at least 80% improvement in her left knee pain. She has noted increased right knee pain that is constant and worse with standing/walking. She rates her pain as an 8 on a scale of 1-10. Pain is sharp and aching.     Unable to take NSAIDs due to previous bariatric surgery. She is taking neurontin and thinks this helps.     Knee brace for left knee helps, but won't stay up. It keeps falling down.       Past Medical History:   Diagnosis Date    Arthritis     High cholesterol     Hypertension     Hyperthyroidism          Current Outpatient Medications:     amlodipine (NORVASC) 5 MG tablet, Take 10 mg by mouth once daily. , Disp: , Rfl:     ARIPiprazole (ABILIFY) 15 MG Tab, Take 15 mg by mouth once daily., Disp: , Rfl:     buPROPion (WELLBUTRIN XL) 300 MG 24 hr tablet, Take 300 mg by mouth once daily., Disp: , Rfl:     citalopram (CELEXA) 10 MG tablet, Take 60 mg by mouth once daily., Disp: , Rfl:     clonazePAM (KLONOPIN) 1 MG tablet, Take 1 mg by mouth 3 (three) times daily as needed., Disp: , Rfl:     estradiol (ESTRING) 2 mg vaginal ring, Take 2 mg by mouth once daily. , Disp: , Rfl:     folic acid (FOLVITE) 1 MG tablet, Take 1,000 mcg by mouth once daily., Disp: , Rfl:     gabapentin (NEURONTIN) 400 MG capsule, Take 400 mg by mouth 3 (three) times daily., Disp: , Rfl:     levothyroxine (SYNTHROID) 50 MCG tablet, Take 50  "mcg by mouth once daily., Disp: , Rfl:     NON FORMULARY MEDICATION, B12 plus D3/K2 Patch Multivitamin Patch, Disp: , Rfl:     VITAMIN D2 1,250 mcg (50,000 unit) capsule, Take 50,000 Units by mouth every 7 days., Disp: , Rfl:     VYVANSE 40 mg Cap, Take 1 capsule by mouth once daily., Disp: , Rfl:     FEROSUL 325 mg (65 mg iron) Tab tablet, Take by mouth once daily., Disp: , Rfl:     Review of patient's allergies indicates:   Allergen Reactions    Nsaids (non-steroidal anti-inflammatory drug)      Can't not take due to bariatric surgery        Review of Systems   Constitutional: Negative for chills, fever, night sweats and weight gain.   Gastrointestinal: Negative for bowel incontinence, nausea and vomiting.   Genitourinary: Negative for bladder incontinence.   Neurological: Negative for disturbances in coordination and loss of balance.         Objective:        Ht 5' 7" (1.702 m)   Wt (!) 169.2 kg (373 lb 1.6 oz)   BMI 58.44 kg/m²     Ortho/SPM Exam    Body habitus is obese.   The patient walks without a limp.      RIGHT KNEE EXAM:  Resisted SLR negative.   The skin over the knee is intact.  Knee effusion not obvious   Tendernes is located medial   Range of motion- Flexion full, Extension full,     Ligament exam:   MCL intact   Lachman intact              Post sag intact    LCL intact    Patellar apprehension negative.  Popliteal cyst negative  Patellar crepitation absent.  Flexion/pinch negative.    Motor normal 5/5 strength in all tested muscle groups.   Sensory normal.        Assessment:       Encounter Diagnosis   Name Primary?    Primary osteoarthritis of right knee Yes          Plan:       Lisa was seen today for pain.    Diagnoses and all orders for this visit:    Primary osteoarthritis of right knee  -     Large Joint Aspiration/Injection: R knee      She did great with left knee injection and had at least 80% improvement in her left knee pain. She has noted increased right knee pain that is " constant and worse with standing/walking.     Known  moderate right medial joint space narrowing, moderate/severe left medial joint space narrowing with left patellofemoral arthritis.     Treatment options reviewed with patient and following plan made:     - RIGHT knee injection done without complication. See procedure note.   - Unable to take NSAIDs due to gastric bypass.   - Continue on neurontin from PCP.  - Weight loss recommended and discussed.   - Can repeat injections every 3 months if helpful.   - Consider custom hinged knee brace if pain gets worse.     Follow up in about 3 months (around 7/6/2022).

## 2022-04-06 NOTE — PATIENT INSTRUCTIONS
It was nice to see you again today! I am sorry that you are hurting so much.   You have some wear and tear in your right knee (arthritis) and this is likely what is causing your pain.   The injection that I did today should give you some good relief of pain. It is normal to have some increased soreness over the next few days after an injection. Put ice on it and elevate. This will get better.   Let me know if you want to try the custom knee brace.   Work on weight loss- this will help with knee pain.  I will see you back in 3 months, but please stay in touch and call me if you need anything. You can also send me a message in [facility].   Rossy   [phone]

## 2022-04-06 NOTE — TELEPHONE ENCOUNTER
----- Message from Theodore Hendrickson sent at 4/6/2022  8:09 AM CDT -----  Contact: pt.  .Type:  Same Day Appointment Request    Caller is requesting a same day appointment.  Caller declined first available appointment listed below.    Name of Caller:pt  When is the first available appointment 4/06/22  Symptoms:right knee  Best Call Back Number:[phone]  Additional Information: Pt. Needs and  x-ray for her right knee order prior to appt today for 10:30

## 2022-04-13 ENCOUNTER — PATIENT MESSAGE (OUTPATIENT)
Dept: ORTHOPEDICS | Facility: CLINIC | Age: 52
End: 2022-04-13
Payer: MEDICAID

## 2022-04-13 ENCOUNTER — TELEPHONE (OUTPATIENT)
Dept: ORTHOPEDICS | Facility: CLINIC | Age: 52
End: 2022-04-13
Payer: MEDICAID

## 2022-04-13 NOTE — TELEPHONE ENCOUNTER
Let her know that the steroid injection I did can take 7-10 days (and sometimes 2 weeks) to kick in. During that time, the pain can get worse before it gets better.     I recommend that she ice and elevate knee. This will help.     I can also put in an order for a custom knee brace if she wants to do this- does she want it for right knee?     I do not recommend an MRI of the knee. I think her pain is from the arthritis that we saw on her xrays.

## 2022-04-13 NOTE — TELEPHONE ENCOUNTER
----- Message from Danielle Aden MA sent at 4/13/2022  8:31 AM CDT -----  Pt went to ER with left knee pain . Pt thinks she needs an MRI. She recently has injections in both knees. Please advise.

## 2022-04-13 NOTE — TELEPHONE ENCOUNTER
Spoke to pt advised her to elevate and ice as much as possible. Informed that the injection can take up to 7-10 days to be effective. Offered pt custom knee brace, she wants to hold off on receiving it to see if pain subsides with medication she received at the ER and will wait to be seen on her 3 month F/U

## 2022-04-18 ENCOUNTER — HOSPITAL ENCOUNTER (EMERGENCY)
Facility: HOSPITAL | Age: 52
Discharge: HOME | End: 2022-04-18
Attending: EMERGENCY MEDICINE
Payer: MEDICAID

## 2022-04-18 VITALS
SYSTOLIC BLOOD PRESSURE: 161 MMHG | BODY MASS INDEX: 56.38 KG/M2 | RESPIRATION RATE: 18 BRPM | WEIGHT: 293 LBS | HEART RATE: 80 BPM | OXYGEN SATURATION: 99 % | TEMPERATURE: 98 F | DIASTOLIC BLOOD PRESSURE: 74 MMHG

## 2022-04-18 DIAGNOSIS — M25.561 ACUTE PAIN OF RIGHT KNEE: Primary | ICD-10-CM

## 2022-04-18 PROCEDURE — 99284 EMERGENCY DEPT VISIT MOD MDM: CPT

## 2022-04-18 PROCEDURE — 63600175 PHARM REV CODE 636 W HCPCS: Performed by: EMERGENCY MEDICINE

## 2022-04-18 PROCEDURE — 96372 THER/PROPH/DIAG INJ SC/IM: CPT | Performed by: EMERGENCY MEDICINE

## 2022-04-18 PROCEDURE — 25000003 PHARM REV CODE 250: Performed by: EMERGENCY MEDICINE

## 2022-04-18 RX ORDER — DICLOFENAC SODIUM 10 MG/G
2 GEL TOPICAL 4 TIMES DAILY
Qty: 200 G | Refills: 0 | Status: SHIPPED | OUTPATIENT
Start: 2022-04-18 | End: 2023-08-01

## 2022-04-18 RX ORDER — ONDANSETRON 8 MG/1
8 TABLET, ORALLY DISINTEGRATING ORAL
Status: COMPLETED | OUTPATIENT
Start: 2022-04-18 | End: 2022-04-18

## 2022-04-18 RX ORDER — MORPHINE SULFATE 4 MG/ML
8 INJECTION, SOLUTION INTRAMUSCULAR; INTRAVENOUS
Status: COMPLETED | OUTPATIENT
Start: 2022-04-18 | End: 2022-04-18

## 2022-04-18 RX ADMIN — ONDANSETRON 8 MG: 8 TABLET, ORALLY DISINTEGRATING ORAL at 09:04

## 2022-04-18 RX ADMIN — MORPHINE SULFATE 8 MG: 4 INJECTION INTRAVENOUS at 09:04

## 2022-04-19 ENCOUNTER — PATIENT MESSAGE (OUTPATIENT)
Dept: ORTHOPEDICS | Facility: CLINIC | Age: 52
End: 2022-04-19
Payer: MEDICAID

## 2022-04-19 ENCOUNTER — TELEPHONE (OUTPATIENT)
Dept: ORTHOPEDICS | Facility: CLINIC | Age: 52
End: 2022-04-19
Payer: MEDICAID

## 2022-04-19 NOTE — ED PROVIDER NOTES
Encounter Date: 4/18/2022       History     Chief Complaint   Patient presents with    Knee Pain     Patient reports arthritis to both knees. Steroid injection to right knee 6 days ago with relief. Was prescribed pain medicine but ran out 2 days ago. Patient states unable to ambulate without any medicine. Has been elevating and applying ice to right knee but has not helped with pain.      Lisa Salmon is a 51 y.o. female who  has a past medical history of Arthritis, High cholesterol, Hypertension, and Hyperthyroidism.    The patient presents to the ED due to right knee pain.  Patient reports this has been an ongoing problem.  Patient on 04/06 had a steroid injection into her knee.  She then went to the ED at [facility] where an x-ray was obtained which showed arthritis and she was given hydrocodone acetaminophen and discharged.  Patient reports she has ran out of her hydrocodone but states the pain has become worse.  She reports she cannot walk due to the pain.  She denies any swelling fever numbness or weakness.  Her pain is relieved with certain positions.  She has had no recent falls.        Review of patient's allergies indicates:   Allergen Reactions    Nsaids (non-steroidal anti-inflammatory drug)      Can't not take due to bariatric surgery      Past Medical History:   Diagnosis Date    Arthritis     High cholesterol     Hypertension     Hyperthyroidism      Past Surgical History:   Procedure Laterality Date    CHOLECYSTECTOMY      HYSTERECTOMY       No family history on file.  Social History     Tobacco Use    Smoking status: Never Smoker   Substance Use Topics    Alcohol use: No    Drug use: No     Review of Systems   Constitutional: Negative for fever.   Gastrointestinal: Positive for nausea.   Musculoskeletal: Positive for arthralgias.   Skin: Negative for color change.   Neurological: Negative for weakness and numbness.       Physical Exam     Initial Vitals   BP Pulse Resp Temp SpO2    04/18/22 2032 04/18/22 2028 04/18/22 2028 04/18/22 2028 04/18/22 2028   (!) 161/74 80 18 98 °F (36.7 °C) 99 %      MAP       --                Physical Exam    Constitutional: No distress.   Elevated BMI   HENT:   Head: Normocephalic and atraumatic.   Eyes: Conjunctivae are normal.   Cardiovascular: Intact distal pulses.   Pulmonary/Chest: No respiratory distress.   Musculoskeletal:      Comments: Right lower extremity:  Sensation intact to light touch, DP/PT pulses palpable.  Diffuse tenderness to palpation of her knee.  Full range of motion no swelling warmth or apparent laxity.     Neurological: She is alert.   Skin: Skin is warm and dry.   Psychiatric: She has a normal mood and affect.         ED Course   Procedures  Labs Reviewed - No data to display       Imaging Results    None          Medications   morphine injection 8 mg (has no administration in time range)     Medical Decision Making:   Differential Diagnosis:   Differential Diagnosis includes, but is not limited to:  Fracture, dislocation, compartment syndrome, nerve injury/palsy, vascular injury, rhabdomyolysis, hemarthrosis, septic joint, bursitis, muscle strain, ligament tear/sprain, laceration with foreign body, abrasion, soft tissue contusion, osteoarthritis.    ED Management:  51-year-old female with right knee pain.  Vital signs are stable.  Physical exam is overall reassuring.  No signs to suggest septic joint.  No recent falls or exam findings to suggest fracture or dislocation.  She is neurovascularly intact.  No indication for x-ray or advanced imaging has been identified today.     Patient is understandably frustrated about her persistent pain.  While no emergent process has been identified today and she appears stable for discharge, I counseled patient that she would benefit from close follow-up with her orthopedic team for further evaluation of her acute on chronic knee pain.  Will discharge patient with Voltaren topical gel.  Will treat  her pain here.  A knee brace was offered here. We discussed return precautions for increased pain swelling redness numbness weakness or any other concerns.    After taking into careful account the historical factors and physical exam findings of the patient's presentation today, in conjunction with the empirical and objective data obtained on ED workup, no acute emergent medical condition has been identified. The patient appears to be low risk for an emergent medical condition and I feel it is safe and appropriate at this time for the patient to be discharged to follow-up as detailed in their discharge instructions for reevaluation and possible continued outpatient workup and management. I have discussed the specifics of the workup with the patient and the patient has verbalized understanding of the details of the workup, the diagnosis, the treatment plan, and the need for outpatient follow-up.  Although the patient has no emergent etiology today this does not preclude the development of an emergent condition so in addition, I have advised the patient that they can return to the ED and/or activate EMS at any time with worsening of their symptoms, change of their symptoms, or with any other medical complaint.  The patient remained comfortable and stable during their visit in the ED.  Discharge and follow-up instructions discussed with the patient who expressed understanding and willingness to comply with my recommendations.                        Clinical Impression:   Final diagnoses:  [M25.561] Acute pain of right knee (Primary)          ED Disposition Condition    Discharge Stable        ED Prescriptions     Medication Sig Dispense Start Date End Date Auth. Provider    diclofenac sodium (VOLTAREN ARTHRITIS PAIN) 1 % Gel Apply 2 g topically 4 (four) times daily. 200 g 4/18/2022  Juan Miguel Flores Jr., MD        Follow-up Information     Follow up With Specialties Details Why Contact Info Additional Information      - Orthopedics Orthopedics Schedule an appointment as soon as possible for a visit   [address], [address]  [facility] [address] [phone] Use [address] and park in adjacent surface lot. Check in at [address] .          Portions of this note were dictated using voice recognition software and may contain dictation related errors in spelling/grammar/syntax not found on text review       Juan Miguel Flores Jr., MD  04/18/22 6743

## 2022-04-19 NOTE — TELEPHONE ENCOUNTER
----- Message from Ching Driscoll sent at 4/19/2022  8:21 AM CDT -----  Contact: pt  Type:  Needs Medical Advice    Who Called: pt  Symptoms (please be specific):  pt wants to sched a f/u ER visit from last night - can't walk  How long has patient had these symptoms:    Pharmacy name and phone #:    Would the patient rather a call back or a response via [facility]? call  Best Call Back Number: [phone] (M  Additional Information:

## 2022-04-19 NOTE — ED NOTES
Review of patient's allergies indicates:   Allergen Reactions    Nsaids (non-steroidal anti-inflammatory drug)      Can't not take due to bariatric surgery         Patient has verified the spelling of their name and  on armband.   APPEARANCE: Patient is alert, calm, oriented x 4, and does not appear distressed.  SKIN: Skin is normal for race, warm, and dry. Normal skin turgor and mucous membranes moist.  CARDIAC: Normal rate and rhythm, no murmur heard.   RESPIRATORY:Normal rate and effort. Breath sounds clear bilaterally throughout chest. Respirations are equal and unlabored.    GASTRO: Bowel sounds normal, abdomen is soft, no tenderness, and no abdominal distention.  MUSCLE: Full ROM. No bony tenderness or soft tissue tenderness. No obvious deformity. +right knee pain, pain rated 10/10 with ambulation

## 2022-04-25 NOTE — TELEPHONE ENCOUNTER
"Spoke with patient. Still with severe right knee pain. Feels like "something ripped" in her knee.     Knee is not hot, red, or swollen.     Please schedule her to see me on Friday at 2:00 (can double book) and put on [facility] for her. She is aware.   "

## 2022-04-28 NOTE — PROGRESS NOTES
"Subjective:      Patient ID: Lisa Salmon is a 51 y.o. female.    Chief Complaint: Pain of the Right Knee      HPI  ([place])    History of bariatric surgery.      Last seen by me on 4/6/22 for right knee pain. Known moderate right medial joint space narrowing, moderate/severe left medial joint space narrowing with left patellofemoral arthritis.     She had RIGHT knee injection on 4/6/22 and LEFT knee injection on 3/17/22. Unable to take NSAIDs due to history of gastric bypass. Weight loss recommended as well.     She did great with left knee injection. She never had improvement in right knee pain after injection. A week or so after the injection, she got up to use the bathroom and she feels like "something ripped" in her knee. She was seen in ED x 2.     She has constant right knee pain that is worse with standing and walking. She rates her pain as a 9 on a scale of 1-10. Pain is sharp and aching.     As above, Unable to take NSAIDs due to previous bariatric surgery.       Past Medical History:   Diagnosis Date    Arthritis     High cholesterol     Hypertension     Hyperthyroidism          Current Outpatient Medications:     amlodipine (NORVASC) 5 MG tablet, Take 10 mg by mouth once daily. , Disp: , Rfl:     ARIPiprazole (ABILIFY) 15 MG Tab, Take 15 mg by mouth once daily., Disp: , Rfl:     buPROPion (WELLBUTRIN XL) 300 MG 24 hr tablet, Take 300 mg by mouth once daily., Disp: , Rfl:     citalopram (CELEXA) 10 MG tablet, Take 60 mg by mouth once daily., Disp: , Rfl:     clonazePAM (KLONOPIN) 1 MG tablet, Take 1 mg by mouth 3 (three) times daily as needed., Disp: , Rfl:     diclofenac sodium (VOLTAREN ARTHRITIS PAIN) 1 % Gel, Apply 2 g topically 4 (four) times daily., Disp: 200 g, Rfl: 0    estradiol (ESTRING) 2 mg vaginal ring, Take 2 mg by mouth once daily. , Disp: , Rfl:     folic acid (FOLVITE) 1 MG tablet, Take 1,000 mcg by mouth once daily., Disp: , Rfl:     gabapentin (NEURONTIN) 400 MG capsule, " "Take 400 mg by mouth 3 (three) times daily., Disp: , Rfl:     levothyroxine (SYNTHROID) 50 MCG tablet, Take 50 mcg by mouth once daily., Disp: , Rfl:     NON FORMULARY MEDICATION, B12 plus D3/K2 Patch Multivitamin Patch, Disp: , Rfl:     VITAMIN D2 1,250 mcg (50,000 unit) capsule, Take 50,000 Units by mouth every 7 days., Disp: , Rfl:     VYVANSE 40 mg Cap, Take 1 capsule by mouth once daily., Disp: , Rfl:     Review of patient's allergies indicates:   Allergen Reactions    Nsaids (non-steroidal anti-inflammatory drug)      Can't not take due to bariatric surgery        Review of Systems   Constitutional: Negative for chills, fever, night sweats and weight gain.   Gastrointestinal: Negative for bowel incontinence, nausea and vomiting.   Genitourinary: Negative for bladder incontinence.   Neurological: Negative for disturbances in coordination and loss of balance.         Objective:        Ht 5' 7" (1.702 m)   Wt (!) 176.5 kg (389 lb 1.6 oz)   BMI 60.94 kg/m²     Ortho/SPM Exam    Body habitus is obese.   She is in a WC. She can  room and walk with support limping on right knee.       RIGHT KNEE EXAM:  Resisted SLR negative.   The skin over the knee is intact.  Knee effusion not obvious   Tendernes is located medial   Range of motion- Flexion 90 with pain, Extension full with pain,     Ligament exam:   MCL intact   Lachman intact              Post sag intact    LCL intact    Patellar apprehension negative.  Popliteal cyst negative  Patellar crepitation absent    Motor normal 5/5 strength in all tested muscle groups.   Sensory normal.        Assessment:       Encounter Diagnosis   Name Primary?    Primary osteoarthritis of right knee Yes          Plan:       Lisa was seen today for pain.    Diagnoses and all orders for this visit:    Primary osteoarthritis of right knee  -     MRI Knee Without Contrast Right; Future      She did great with left knee injection and had at least 80% improvement in her " left knee pain.     No improvement with right knee injection on 4/6/22. She has constant right knee pain that is worse with standing/walking.     Known  moderate right medial joint space narrowing, moderate/severe left medial joint space narrowing with left patellofemoral arthritis.    Increased right knee pain may be due to post injection flare, but I would think this would be improving by this point. May also be due to bone marrow edema.     Treatment options reviewed with patient and following plan made:     - MRI of right knee to evaluate for bone marrow edema that would explain severe pain. No relief with time, injections, or medications. She would not tolerate PT due to current pain level.   - Given pull on hinged knee sleeve to wear prn with standing/walking.   - Unable to take NSAIDs due to gastric bypass.   - Narcotic pain medications not recommended. She was not happy about this.   - Continue on neurontin from PCP.  - She will f/u after MRI for review. Given work note keeping her out until follow up.     Follow up in about 3 weeks (around 5/20/2022).         ADDENDUM 5/11/22:   Peer to peer done for right knee MRI. This is denied as she has not had physical therapy. Spoke with Dr. Epstein for peer to peer. Discussed that I did not think she would tolerate PT due to pain. Will need PT note stating this in order for MRI to be approved.     Will advise patient and order PT if she wants to proceed. If she does not want to do this, then recommend she f/u with Dr. Rogel at his next available appointment.

## 2022-04-29 ENCOUNTER — OFFICE VISIT (OUTPATIENT)
Dept: ORTHOPEDICS | Facility: CLINIC | Age: 52
End: 2022-04-29
Payer: MEDICAID

## 2022-04-29 VITALS — WEIGHT: 293 LBS | HEIGHT: 67 IN | BODY MASS INDEX: 45.99 KG/M2

## 2022-04-29 DIAGNOSIS — M17.11 PRIMARY OSTEOARTHRITIS OF RIGHT KNEE: Primary | ICD-10-CM

## 2022-04-29 PROCEDURE — 99999 PR PBB SHADOW E&M-EST. PATIENT-LVL III: CPT | Mod: PBBFAC,,, | Performed by: PHYSICIAN ASSISTANT

## 2022-04-29 PROCEDURE — 99213 OFFICE O/P EST LOW 20 MIN: CPT | Mod: S$PBB,,, | Performed by: PHYSICIAN ASSISTANT

## 2022-04-29 PROCEDURE — 1160F RVW MEDS BY RX/DR IN RCRD: CPT | Mod: CPTII,,, | Performed by: PHYSICIAN ASSISTANT

## 2022-04-29 PROCEDURE — 1160F PR REVIEW ALL MEDS BY PRESCRIBER/CLIN PHARMACIST DOCUMENTED: ICD-10-PCS | Mod: CPTII,,, | Performed by: PHYSICIAN ASSISTANT

## 2022-04-29 PROCEDURE — 99213 PR OFFICE/OUTPT VISIT, EST, LEVL III, 20-29 MIN: ICD-10-PCS | Mod: S$PBB,,, | Performed by: PHYSICIAN ASSISTANT

## 2022-04-29 PROCEDURE — 1159F PR MEDICATION LIST DOCUMENTED IN MEDICAL RECORD: ICD-10-PCS | Mod: CPTII,,, | Performed by: PHYSICIAN ASSISTANT

## 2022-04-29 PROCEDURE — 99213 OFFICE O/P EST LOW 20 MIN: CPT | Mod: PBBFAC,PN | Performed by: PHYSICIAN ASSISTANT

## 2022-04-29 PROCEDURE — 3008F PR BODY MASS INDEX (BMI) DOCUMENTED: ICD-10-PCS | Mod: CPTII,,, | Performed by: PHYSICIAN ASSISTANT

## 2022-04-29 PROCEDURE — 99999 PR PBB SHADOW E&M-EST. PATIENT-LVL III: ICD-10-PCS | Mod: PBBFAC,,, | Performed by: PHYSICIAN ASSISTANT

## 2022-04-29 PROCEDURE — 1159F MED LIST DOCD IN RCRD: CPT | Mod: CPTII,,, | Performed by: PHYSICIAN ASSISTANT

## 2022-04-29 PROCEDURE — 3008F BODY MASS INDEX DOCD: CPT | Mod: CPTII,,, | Performed by: PHYSICIAN ASSISTANT

## 2022-04-29 NOTE — PATIENT INSTRUCTIONS
I am sorry that you are hurting so much.     Increased pain may be due to a flare up from the steroid injection, this sometimes happens where pain gets worse before it gets better. You may also have a bone bruise where you have the arthritis.     I want to get an MRI of your knee to look into things further. I will put results and my recommendations on [facility] for you.     Wear the knee brace as needed to help with pain when walking.     I have given you a work note. Let me know if I need to fill out any other paperwork.     Rossy

## 2022-05-11 ENCOUNTER — TELEPHONE (OUTPATIENT)
Dept: ORTHOPEDICS | Facility: CLINIC | Age: 52
End: 2022-05-11
Payer: MEDICAID

## 2022-05-11 NOTE — TELEPHONE ENCOUNTER
Spoke with pt and gave her Rossy Camacho PA-C information and recommendations a follows:     Please call and let her know that her insurance did not approve the MRI of her knee.     Per her insurance, she would need to try physical therapy prior to MRI being approved. I explained that I did not think she would tolerate PT due to knee pain. Insurance says she would need to go to PT and have a documented note from them saying she could not tolerate it.     If she wants to try the PT, let me know and I will order it.     If not, then I recommend she follow up with Dr. Rogel at his next available appointment. I can give her a work note to cover her until this visit. She can be seen here or in [place] (put per me) and she can also be put on wait list.     I am happy to see her on Friday 5/20, but I think she she see .      Pt stated that she will think about seeing Dr. Rogel and to cancel her 05/20/22 appt with NABILA Camacho, and disconnected our conversation.

## 2022-06-20 ENCOUNTER — TELEPHONE (OUTPATIENT)
Dept: ORTHOPEDICS | Facility: CLINIC | Age: 52
End: 2022-06-20
Payer: MEDICAID

## 2022-06-20 NOTE — TELEPHONE ENCOUNTER
----- Message from Kalyan Oneil sent at 6/17/2022  3:32 PM CDT -----  Contact: pt  Type: Requesting to speak with nurse        Who Called: PT  Regarding: would like a call back  Would the patient rather a call back or a response via [facility]? Call back  Best Call Back Number:[phone]  Additional Information:

## 2022-07-16 PROCEDURE — 99284 EMERGENCY DEPT VISIT MOD MDM: CPT

## 2022-07-17 ENCOUNTER — HOSPITAL ENCOUNTER (EMERGENCY)
Facility: HOSPITAL | Age: 52
Discharge: HOME | End: 2022-07-17
Payer: MEDICAID

## 2022-07-17 VITALS
OXYGEN SATURATION: 95 % | DIASTOLIC BLOOD PRESSURE: 84 MMHG | HEART RATE: 59 BPM | SYSTOLIC BLOOD PRESSURE: 132 MMHG | RESPIRATION RATE: 22 BRPM | TEMPERATURE: 98 F

## 2022-07-17 DIAGNOSIS — M25.562 POSTERIOR LEFT KNEE PAIN: ICD-10-CM

## 2022-07-17 LAB — D DIMER PPP IA.FEU-MCNC: 0.37 MG/L FEU

## 2022-07-17 PROCEDURE — 85379 FIBRIN DEGRADATION QUANT: CPT

## 2022-07-17 PROCEDURE — 25000003 PHARM REV CODE 250

## 2022-07-17 PROCEDURE — 96372 THER/PROPH/DIAG INJ SC/IM: CPT

## 2022-07-17 PROCEDURE — 63600175 PHARM REV CODE 636 W HCPCS

## 2022-07-17 RX ORDER — KETOROLAC TROMETHAMINE 30 MG/ML
30 INJECTION, SOLUTION INTRAMUSCULAR; INTRAVENOUS
Status: COMPLETED | OUTPATIENT
Start: 2022-07-17 | End: 2022-07-17

## 2022-07-17 RX ORDER — ACETAMINOPHEN 500 MG
1000 TABLET ORAL
Status: COMPLETED | OUTPATIENT
Start: 2022-07-17 | End: 2022-07-17

## 2022-07-17 RX ORDER — CYCLOBENZAPRINE HCL 5 MG
5 TABLET ORAL 3 TIMES DAILY PRN
Qty: 15 TABLET | Refills: 0 | OUTPATIENT
Start: 2022-07-17 | End: 2022-09-01

## 2022-07-17 RX ORDER — DEXAMETHASONE SODIUM PHOSPHATE 4 MG/ML
8 INJECTION, SOLUTION INTRA-ARTICULAR; INTRALESIONAL; INTRAMUSCULAR; INTRAVENOUS; SOFT TISSUE
Status: COMPLETED | OUTPATIENT
Start: 2022-07-17 | End: 2022-07-17

## 2022-07-17 RX ADMIN — KETOROLAC TROMETHAMINE 30 MG: 30 INJECTION, SOLUTION INTRAMUSCULAR; INTRAVENOUS at 04:07

## 2022-07-17 RX ADMIN — ACETAMINOPHEN 1000 MG: 500 TABLET ORAL at 01:07

## 2022-07-17 RX ADMIN — DEXAMETHASONE SODIUM PHOSPHATE 8 MG: 4 INJECTION, SOLUTION INTRA-ARTICULAR; INTRALESIONAL; INTRAMUSCULAR; INTRAVENOUS; SOFT TISSUE at 04:07

## 2022-07-17 NOTE — ED NOTES
Pt in the semi- moran's position, A & O x 3, w/out complaint at this time. Pt denies SOB, respirations are even and unlabored. Skin is warm dry and pink. VSS. Will continue to monitor closely.

## 2022-07-17 NOTE — ED NOTES
Pt c/o L knee pain after she awoke from a afternoon nap. Pt denies trauma, states, she woke up and was unable to move it due to the pain and is unable to bear weight. Pt states the pain is localized to the area behind the L knee. Pt is A & O x 3, denies SOB, fever, chills and N/V/D. No obvious respiratory distress noted. Respirations are even and unlabored. Skin is warm and dry w/ pink mucosa. VS. TIFFANY x 3mm. BBS- CTA . Abd- SNT. PSM x 4 exts. Bed is locked, in the low position and locked for safety. Call bell and daughter @ BS. Will continue to monitor closely.

## 2022-07-17 NOTE — ED NOTES
Pt in semi-fowlers position, A & O x 3 w/out complaint at this time. Skin is warm, dry and pink.Bed remains locked and in the low position w/ the rails raised and locked for pts safety. Will continue to monitor closely.

## 2022-07-17 NOTE — ED NOTES
Pt in the semi- fowlers position, A & O x 3. Pt denies SOB or any respiratory distress at this time. Respirations are even and unlabored. Skin is warm, dry and pink. VS. Will continue to monitor closely.

## 2022-07-17 NOTE — ED PROVIDER NOTES
Encounter Date: 7/16/2022       History     Chief Complaint   Patient presents with    Leg Pain     States she woke up from a nap and couldn't move the L leg due to pain behind the knee, states she cannot bear wt on it, pt denies trauma     HPI   Patient presenting to ED for evaluation of left knee pain starting earlier this evening.  Patient says she was taking a nap on the couch, when she woke up and tried to get up, she had severe pain in the posterior aspect of her left knee and has been unable to bear weight or ambulate on left leg since then.  Patient denies known trauma or injury.  Says she had otherwise felt fine and was walking normally prior to onset of symptoms this evening.  Patient is able to move left leg while lying in bed with minimal discomfort until she reaches full extension at left knee joint which reproduces pain.  No other specific complaints at this time.      Review of patient's allergies indicates:   Allergen Reactions    Penicillins Other (See Comments)     Causes throat swelling    Nsaids (non-steroidal anti-inflammatory drug)      Can't not take due to bariatric surgery      Past Medical History:   Diagnosis Date    Arthritis     High cholesterol     Hypertension     Hyperthyroidism      Past Surgical History:   Procedure Laterality Date    CHOLECYSTECTOMY      HYSTERECTOMY       No family history on file.  Social History     Tobacco Use    Smoking status: Never Smoker   Substance Use Topics    Alcohol use: No    Drug use: No     Review of Systems   Constitutional: Negative for chills and fever.   HENT: Negative for congestion and rhinorrhea.    Respiratory: Negative for cough and shortness of breath.    Cardiovascular: Negative for chest pain.   Gastrointestinal: Negative for abdominal pain, nausea and vomiting.   Musculoskeletal: Positive for arthralgias.   Skin: Negative for rash.   Allergic/Immunologic: Negative for immunocompromised state.   Neurological: Negative for  light-headedness and headaches.   Hematological: Does not bruise/bleed easily.       Physical Exam     Initial Vitals [07/16/22 2211]   BP Pulse Resp Temp SpO2   126/81 65 18 97.7 °F (36.5 °C) (!) 94 %      MAP       --         Physical Exam    Constitutional: She appears well-developed. No distress.   Morbid obesity   Eyes: EOM are normal. Pupils are equal, round, and reactive to light.   Neck:   Normal range of motion.  Cardiovascular: Normal rate, regular rhythm, normal heart sounds and intact distal pulses.   Pulmonary/Chest: Breath sounds normal. No respiratory distress.   Musculoskeletal:         General: Normal range of motion.      Cervical back: Normal range of motion.        Legs:       Comments: Full ROM left knee without pain until reaching full extension.  Tenderness to popliteal fossa.  Difficult to assess swelling or effusion due to morbid obesity.  No ligamentous laxity.  No tenderness to calf.  Distal neurovascular function intact.     Neurological: She is alert and oriented to person, place, and time.   Skin: Skin is warm and dry. Capillary refill takes less than 2 seconds.         ED Course   Procedures  Labs Reviewed   D DIMER, QUANTITATIVE          Imaging Results          X-Ray Knee 3 View Left (Final result)  Result time 07/17/22 01:32:53    Final result by Guillermo Montano MD (07/17/22 01:32:53)                 Impression:      No acute findings evident within the left knee.    Tricompartment osteoarthritis.      Electronically signed by: Guillermo Montano  Date:    07/17/2022  Time:    01:32             Narrative:    EXAMINATION:  XR KNEE 3 VIEW LEFT    CLINICAL HISTORY:  Pain in left knee    TECHNIQUE:  AP, lateral, and Merchant views of the left knee were performed.    COMPARISON:  None    FINDINGS:  Osteoarthritic changes throughout all 3 compartments are noted with joint space narrowing most severe in the medial compartment.  There is no evidence of fracture, dislocation or effusion.   Surrounding soft tissues demonstrate a small calcification in the prepatellar space.                                 Medications   acetaminophen tablet 1,000 mg (1,000 mg Oral Given 7/17/22 0117)   dexamethasone injection 8 mg (8 mg Intramuscular Given 7/17/22 0433)   ketorolac injection 30 mg (30 mg Intramuscular Given 7/17/22 0432)                 ED Course as of 07/17/22 2126   Sun Jul 17, 2022   0224 D-Dimer: 0.37  Negative d-dimer. [KB]      ED Course User Index  [KB] Gilmer Ny MD           MDM:  Xrays left knee shows arthritic changes without acute abnormality.  D-dimer negative making DVT highly unlikely.  Given location of pain and tenderness on exam, suspect possible ruptured Bakers cyst or other benign musculoskeletal issue.  Patient unable to take NSAIDs due to gastric sleeve, was given IM decadron to help with anti-inflammatory effects.  Also provided Rx for muscle relaxant to use PRN.  Does not appear to be indication for further emergent testing, observation, or hospitalization at this time.  Patient appears stable for and is comfortable with discharge home.  Advised to follow-up with PCP for outpatient recheck.  Signs and symptoms that would warrant immediate return to ED were reviewed prior to discharge.      Clinical Impression:   Final diagnoses:  [M25.562] Posterior left knee pain        ED Disposition Condition    Discharge Stable        ED Prescriptions     Medication Sig Dispense Start Date End Date Auth. Provider    cyclobenzaprine (FLEXERIL) 5 MG tablet Take 1 tablet (5 mg total) by mouth 3 (three) times daily as needed for Muscle spasms. 15 tablet 7/17/2022  Gilmer Ny MD        Follow-up Information     Follow up With Specialties Details Why Contact Info    Mp Howard NP Nephrology Schedule an appointment as soon as possible for a visit  Follow up with your regular doctor for outpatient recheck. [address]  [address]  [facility]  [address]  [phone]      [facility] Emergency Dept Emergency Medicine  Return to the ED sooner for any new or worsening symptoms or for any other concerns. [address]  [facility] [id number] [phone]           Gilmer Ny MD  07/17/22 212

## 2022-07-18 ENCOUNTER — PATIENT OUTREACH (OUTPATIENT)
Dept: EMERGENCY MEDICINE | Facility: HOSPITAL | Age: 52
End: 2022-07-18
Payer: MEDICAID

## 2022-07-18 NOTE — PROGRESS NOTES
Stephane Young  ED Navigator  Emergency Department    Project: [facility] ED Navigator  Role: Community Health Worker    Date: 07/18/2022  Patient Name: Lisa Salmon  MRN: [id number]  PCP: Mp Howard NP    Assessment:     Lisa Salmon is a 51 y.o. female who has presented to ED for leg pain. Patient has visited the ED 1 times in the past 3 months. Patient did contact PCP.     ED Navigator Initial Assessment    ED Navigator Enrollment Documentation  Consent to Services  Does patient consent to completing the assessment?: Yes  Contact  Method of Initial Contact: Phone  Transportation  Does the patient have issues with Transportation?: No  Does the patient have transportation to and from healthcare appointments?: Yes  Insurance Coverage  Do you have coverage/adequate coverage?: Yes  Type/kind of coverage: [place]ID [place] [employer]  Is patient able to afford co-pays/deductibles?: Yes  Is patient able to afford HME or supplies?: Yes  Does patient have an established [facility] PCP?: Yes  Able to access?: Yes  Does the patient have a lack of adequate coverage?: No  Specialist Appointment  Did the patient come to the ED to see a specialist?: No  Does the patient have a pending specialist referral?: No  Does the patient have a specialist appointment made?: No  PCP Follow Up Appointment  Has the patient had an appointment with a primary care provider in the past year?: Yes  Approximate date: 2/18/22  Provider: Mp Howard NP  Does the patient have a follow up appontment with a PCP?: Yes  Upcoming appointment date: 7/18/22  Provider: Mp Howard NP  When was the last time you saw your PCP?: 2/18/22  Medications  Is patient able to afford medication?: Yes  Is patient unable to get medication due to lack of transportation?: No  Psychological  Does the patient have psycho-social concerns?: No  Food  Does the patient have concerns about food?: No  Communication/Education  Does the patient have limited English proficiency/English  not primary language?: No  Does patient have low literacy and/or low health literacy?: No  Does patient have concerns with care?: No  Does patient have dissatisfaction with care?: No  Other Financial Concerns  Does the patient have immediate financial distress?: No  Does the patient have general financial concerns?: No  Other Social Barriers/Concerns  Does the patient have any additional barriers or concerns?: Work  Primary Barrier  Barriers identified: Structural barrier (service availability, waiting times, etc.)  Root Cause of ED Utilization: Lack of Access to Primary Care  Plan to address Lack of Access to Primary Care: Provided patient with information about the [facility] in their area, Provided [facility] PCP assistance line [phone], Provided information for [facility] ([facility], [facility], etc[facility] Provided information for [facility] 24/7 Nurse Triage line [phone] or 1-866-[facility] (1-669.616.2891), Provided information on COVID-[employer] resources and hotline (704-367-7810)  Next steps: Provided Education  Was education/educational materials provided surrounding PCP services/creating a medical home?: Yes Was education verbal or written?: Verbal   Was education/educational materials provided surrounding low cost, healthy foods?: No    Was education/educational materials provided surrounding other items? If so, use comment to explain.: No    Plan: Provided information for [facility] 24/7 Nurse triage line, [phone] or 1-866-[facility] ([phone])  Expected Date of Follow Up 1: 8/8/22  Additional Documentation: Spoke with patient that presented to the ED for leg pain. Patient stated she was doing fine. Patient was asked if she had a PCP to follow up with she stated she does and has an appointment on today. Patient denied needing any other assistance at this time.         Social History      Socioeconomic History    Marital status:    Tobacco Use    Smoking status: Never Smoker   Substance and Sexual Activity    Alcohol use: No    Drug use: No    Sexual activity: Yes     Birth control/protection: Surgical     Social Determinants of Health     Financial Resource Strain: Low Risk     Difficulty of Paying Living Expenses: Not very hard   Food Insecurity: No Food Insecurity    Worried About Running Out of Food in the Last Year: Never true    Ran Out of Food in the Last Year: Never true   Transportation Needs: No Transportation Needs    Lack of Transportation (Medical): No    Lack of Transportation (Non-Medical): No   Physical Activity: Inactive    Days of Exercise per Week: 0 days    Minutes of Exercise per Session: 0 min   Stress: Stress Concern Present    Feeling of Stress : To some extent   Social Connections: Moderately Isolated    Frequency of Communication with Friends and Family: More than three times a week    Frequency of Social Gatherings with Friends and Family: More than three times a week    Attends Alevism Services: Never    Active Member of Clubs or Organizations: No    Attends Club or Organization Meetings: Never    Marital Status:    Housing Stability: Unknown    Unable to Pay for Housing in the Last Year: No    Unstable Housing in the Last Year: No       Plan:     Spoke with patient that presented to the ED for leg pain. Patient stated she was doing fine. Patient was asked if she had a PCP to follow up with she stated she does and has an appointment on today. Patient denied needing any other assistance at this time.      Appointment made with: Mp Howard NP

## 2022-07-21 ENCOUNTER — HOSPITAL ENCOUNTER (EMERGENCY)
Facility: HOSPITAL | Age: 52
Discharge: HOME | End: 2022-07-21
Attending: EMERGENCY MEDICINE
Payer: MEDICAID

## 2022-07-21 VITALS
SYSTOLIC BLOOD PRESSURE: 141 MMHG | WEIGHT: 293 LBS | OXYGEN SATURATION: 98 % | HEART RATE: 74 BPM | TEMPERATURE: 98 F | BODY MASS INDEX: 57.64 KG/M2 | RESPIRATION RATE: 20 BRPM | DIASTOLIC BLOOD PRESSURE: 82 MMHG

## 2022-07-21 DIAGNOSIS — M25.561 ACUTE PAIN OF RIGHT KNEE: Primary | ICD-10-CM

## 2022-07-21 PROCEDURE — 63600175 PHARM REV CODE 636 W HCPCS: Performed by: EMERGENCY MEDICINE

## 2022-07-21 PROCEDURE — 99285 EMERGENCY DEPT VISIT HI MDM: CPT | Mod: 25

## 2022-07-21 PROCEDURE — 96372 THER/PROPH/DIAG INJ SC/IM: CPT | Performed by: EMERGENCY MEDICINE

## 2022-07-21 RX ORDER — HYDROMORPHONE HYDROCHLORIDE 1 MG/ML
1 INJECTION, SOLUTION INTRAMUSCULAR; INTRAVENOUS; SUBCUTANEOUS
Status: COMPLETED | OUTPATIENT
Start: 2022-07-21 | End: 2022-07-21

## 2022-07-21 RX ORDER — ONDANSETRON 4 MG/1
4 TABLET, ORALLY DISINTEGRATING ORAL EVERY 6 HOURS PRN
Qty: 10 TABLET | Refills: 0 | Status: SHIPPED | OUTPATIENT
Start: 2022-07-21

## 2022-07-21 RX ORDER — HYDROCODONE BITARTRATE AND ACETAMINOPHEN 5; 325 MG/1; MG/1
1 TABLET ORAL EVERY 6 HOURS PRN
Qty: 12 TABLET | Refills: 0 | Status: SHIPPED | OUTPATIENT
Start: 2022-07-21 | End: 2023-05-23

## 2022-07-21 RX ADMIN — HYDROMORPHONE HYDROCHLORIDE 1 MG: 1 INJECTION, SOLUTION INTRAMUSCULAR; INTRAVENOUS; SUBCUTANEOUS at 11:07

## 2022-07-22 NOTE — ED PROVIDER NOTES
Encounter Date: 7/21/2022       History     Chief Complaint   Patient presents with    Leg Pain     Pt reports pain behind right knee and calf. Pt denies any injury or trauma. Pt denies shortness of breath. Onset today.      The patient is a 51-year-old who complains of severe right knee pain that began at around 16:30 as she was prepared to leave work.  She has a history of osteoarthritis in both knees.  She denies acute trauma to the joint.  The pain is located posteriorly and is exacerbated by movement.  She had difficulty driving due to the pain.  She took Toradol and Flexeril which provided no relief.  She denies fever, chills, nausea, and vomiting.  She denies weakness and numbness to the extremity.  She denies chest pain, palpitations, shortness of breath.  She has no history of DVT.    She has a past medical history of Arthritis, High cholesterol, Hypertension, and Hyperthyroidism.    The history is provided by the patient. No  was used.     Review of patient's allergies indicates:   Allergen Reactions    Penicillins Other (See Comments)     Causes throat swelling    Nsaids (non-steroidal anti-inflammatory drug)      Can't not take due to bariatric surgery      Past Medical History:   Diagnosis Date    Arthritis     High cholesterol     Hypertension     Hyperthyroidism      Past Surgical History:   Procedure Laterality Date    CHOLECYSTECTOMY      HYSTERECTOMY       No family history on file.  Social History     Tobacco Use    Smoking status: Never Smoker   Substance Use Topics    Alcohol use: No    Drug use: No     Review of Systems   Constitutional: Negative for chills and fever.   Respiratory: Negative for shortness of breath.    Cardiovascular: Negative for chest pain and palpitations.   Gastrointestinal: Negative for nausea and vomiting.   Musculoskeletal:        + right knee pain   Skin: Negative for rash.   Neurological: Negative for weakness and numbness.        Physical Exam     Initial Vitals [07/21/22 1851]   BP Pulse Resp Temp SpO2   132/74 80 20 97.9 °F (36.6 °C) 96 %      MAP       --         Physical Exam    Nursing note and vitals reviewed.  Constitutional: She is not diaphoretic. She is Obese . No distress.   Cardiovascular:   Pulses:       Dorsalis pedis pulses are 2+ on the right side.        Posterior tibial pulses are 2+ on the right side.   Pulmonary/Chest: No respiratory distress.   Musculoskeletal:      Right knee: No swelling or deformity. Decreased range of motion. Tenderness present. No LCL laxity, MCL laxity, ACL laxity or PCL laxity.      Comments: Mild diffuse tenderness to the anterior and posterior surfaces of the right knee.  The joint does not feel hot.  Range of motion is slightly diminished.  There is no joint laxity.    Negative right calf tenderness.  Negative right Homans sign.     Neurological: She is alert and oriented to person, place, and time. GCS score is 15. GCS eye subscore is 4. GCS verbal subscore is 5. GCS motor subscore is 6.   Normal strength throughout the right lower extremity.  Normal sensation to touch throughout the right lower extremity.   Skin: Skin is warm and dry. No pallor.   No rashes or lesions overlying the right knee.   Psychiatric: She has a normal mood and affect. Her speech is normal and behavior is normal. She is not actively hallucinating. She is attentive.         ED Course   Procedures  Labs Reviewed - No data to display       Imaging Results          US Lower Extremity Veins Right (Final result)  Result time 07/21/22 22:12:16    Final result by Miguelangel Burch MD (07/21/22 22:12:16)                 Impression:      No evidence of deep venous thrombosis in the right lower extremity.      Electronically signed by: Miguelangel Burch MD  Date:    07/21/2022  Time:    22:12             Narrative:    EXAMINATION:  US LOWER EXTREMITY VEINS RIGHT    CLINICAL HISTORY:  Pain in right leg    TECHNIQUE:  Duplex and color  flow Doppler evaluation and graded compression of the right lower extremity veins was performed.    COMPARISON:  03/06/2012.    FINDINGS:  Right thigh veins: The common femoral, femoral, popliteal, upper greater saphenous, and deep femoral veins are patent and free of thrombus. The veins are normally compressible and have normal phasic flow and augmentation response.    Right calf veins: The visualized calf veins are patent.    Contralateral CFV: The contralateral (left) common femoral vein is patent and free of thrombus.    Miscellaneous: None                                 Medications   HYDROmorphone injection 1 mg (has no administration in time range)     Medical Decision Making:   History:   Old Medical Records: I decided to obtain old medical records.  Old Records Summarized: other records.       <> Summary of Records: Past medical history reviewed as above.  Clinical Tests:   Radiological Study: Ordered and Reviewed    MDM:  The patient underwent emergent evaluation for acute atraumatic right knee pain.  She was neurovascularly intact throughout the extremity.  Exam findings not consistent with septic arthritis..  Venous ultrasound of the extremity the was negative for DVT.  She was treated with IM pain medication.  She was prescribed medications for supportive management.  She was instructed to rest the joint for a couple of days before returning to normal activities.  Strict ED return instructions were given.  PCP follow-up was advised.             ED Course as of 07/21/22 2300   Thu Jul 21, 2022 2114 Sort note: Lisa Salmon nontoxic/afebrile 51 y.o.  presented to the ED with c/o right posterior knee and calf pain that began today.  Pain is dull and worse with movement.  She reports her concern for possible DVT.  Denies history of DVT, recent travel, recent surgery in she is a nonsmoker.  Does report subjective swelling into her right lower extremity.    Patient seen and medically screened by Physician  Assistant in Sort process due to ED crowding.  Appropriate tests and/or medications ordered.  Care transferred to an alternate provider when patient was placed in an Exam Room from the [facility] for physical exam, additional orders, and disposition. 9:14 PM. KES     [KS]      ED Course User Index  [KS] Dario Lind PA-C             Clinical Impression:   Final diagnoses:  [M25.561] Acute pain of right knee (Primary)          ED Disposition Condition    Discharge Stable        ED Prescriptions     Medication Sig Dispense Start Date End Date Auth. Provider    HYDROcodone-acetaminophen (NORCO) 5-325 mg per tablet Take 1 tablet by mouth every 6 (six) hours as needed (Moderate to severe pain). 12 tablet 7/21/2022  Kervin Triana III, MD    ondansetron (ZOFRAN-ODT) 4 MG TbDL Take 1 tablet (4 mg total) by mouth every 6 (six) hours as needed (nausea). 10 tablet 7/21/2022  Kervin Triana III, MD        Follow-up Information     Follow up With Specialties Details Why Contact Info    Mp Howard NP Nephrology  We recommend that you arrange follow up with your primary care provider within the next few days. [address]  [address]  [facility] [id number] [phone]      ER   Return to this ER or visit any other ER should you have any concerns that you feel need immediate attention.            Kervin Triana III, MD  07/21/22 5021

## 2022-07-22 NOTE — ED NOTES
Pt c/o pain behind right knee, worsens when knee is fully bent or straightened, x 1 day. Pt reports taking Toradol and Flexeril at home w/o relief. No redness, warmth, or swelling reported. Pt denies trauma. Pt AAOx3. Respirations even and unlabored. Skin is warm and dry. NAD noted. CB within reach.    APPEARANCE: Alert, oriented and in no acute distress.  CARDIAC: Hypertensive. Normal rate. Pt denies chest pain.   PERIPHERAL VASCULAR: Normal cap refill. No edema. Warm to touch.    RESPIRATORY: Respirations are even and unlabored. Normal rate. Pt denies SOB. Symmetrical chest expansion bilaterally. No obvious signs of distress.  GASTRO: Abdomen soft, non-tender, no distention.  MUSC: Knee pain. Full ROM. No obvious deformity.  SKIN: Skin is warm and dry.  NEURO: Lafe coma scale: eyes open spontaneously-4, oriented & converses-5, obeys commands-6. No neurological abnormalities.   MENTAL STATUS: Awake, alert and aware of environment.

## 2022-07-26 ENCOUNTER — OFFICE VISIT (OUTPATIENT)
Dept: ORTHOPEDICS | Facility: CLINIC | Age: 52
End: 2022-07-26
Payer: MEDICAID

## 2022-07-26 VITALS — WEIGHT: 293 LBS | BODY MASS INDEX: 45.99 KG/M2 | HEIGHT: 67 IN

## 2022-07-26 DIAGNOSIS — M17.11 PRIMARY OSTEOARTHRITIS OF RIGHT KNEE: Primary | ICD-10-CM

## 2022-07-26 DIAGNOSIS — M17.12 PRIMARY OSTEOARTHRITIS OF LEFT KNEE: ICD-10-CM

## 2022-07-26 PROCEDURE — 1160F RVW MEDS BY RX/DR IN RCRD: CPT | Mod: CPTII,,, | Performed by: ORTHOPAEDIC SURGERY

## 2022-07-26 PROCEDURE — 99213 OFFICE O/P EST LOW 20 MIN: CPT | Mod: PBBFAC,PN,25 | Performed by: ORTHOPAEDIC SURGERY

## 2022-07-26 PROCEDURE — 99999 PR PBB SHADOW E&M-EST. PATIENT-LVL III: ICD-10-PCS | Mod: PBBFAC,,, | Performed by: ORTHOPAEDIC SURGERY

## 2022-07-26 PROCEDURE — 1159F MED LIST DOCD IN RCRD: CPT | Mod: CPTII,,, | Performed by: ORTHOPAEDIC SURGERY

## 2022-07-26 PROCEDURE — 1159F PR MEDICATION LIST DOCUMENTED IN MEDICAL RECORD: ICD-10-PCS | Mod: CPTII,,, | Performed by: ORTHOPAEDIC SURGERY

## 2022-07-26 PROCEDURE — 20610 DRAIN/INJ JOINT/BURSA W/O US: CPT | Mod: 50,PBBFAC,PN | Performed by: ORTHOPAEDIC SURGERY

## 2022-07-26 PROCEDURE — 3008F BODY MASS INDEX DOCD: CPT | Mod: CPTII,,, | Performed by: ORTHOPAEDIC SURGERY

## 2022-07-26 PROCEDURE — 99999 PR PBB SHADOW E&M-EST. PATIENT-LVL III: CPT | Mod: PBBFAC,,, | Performed by: ORTHOPAEDIC SURGERY

## 2022-07-26 PROCEDURE — 20610 LARGE JOINT ASPIRATION/INJECTION: BILATERAL KNEE: ICD-10-PCS | Mod: 50,S$PBB,, | Performed by: ORTHOPAEDIC SURGERY

## 2022-07-26 PROCEDURE — 3008F PR BODY MASS INDEX (BMI) DOCUMENTED: ICD-10-PCS | Mod: CPTII,,, | Performed by: ORTHOPAEDIC SURGERY

## 2022-07-26 PROCEDURE — 99499 UNLISTED E&M SERVICE: CPT | Mod: S$PBB,,, | Performed by: ORTHOPAEDIC SURGERY

## 2022-07-26 PROCEDURE — 99499 NO LOS: ICD-10-PCS | Mod: S$PBB,,, | Performed by: ORTHOPAEDIC SURGERY

## 2022-07-26 PROCEDURE — 1160F PR REVIEW ALL MEDS BY PRESCRIBER/CLIN PHARMACIST DOCUMENTED: ICD-10-PCS | Mod: CPTII,,, | Performed by: ORTHOPAEDIC SURGERY

## 2022-07-26 RX ORDER — TRIAMCINOLONE ACETONIDE 40 MG/ML
80 INJECTION, SUSPENSION INTRA-ARTICULAR; INTRAMUSCULAR
Status: DISCONTINUED | OUTPATIENT
Start: 2022-07-26 | End: 2022-07-26 | Stop reason: HOSPADM

## 2022-07-26 RX ADMIN — TRIAMCINOLONE ACETONIDE 80 MG: 40 INJECTION, SUSPENSION INTRA-ARTICULAR; INTRAMUSCULAR at 11:07

## 2022-07-26 NOTE — PROGRESS NOTES
Patient is seen specifically for palliative knee injection.  She gave informed consent for the procedure bilaterally

## 2022-07-26 NOTE — PROCEDURES
Large Joint Aspiration/Injection: bilateral knee    Date/Time: 7/26/2022 11:00 AM  Performed by: Jose Rogel MD  Authorized by: Jose Rogel MD     Consent Done?:  Yes (Verbal)  Approach:  Anterolateral  Location:  Knee  Laterality:  Bilateral  Site:  Bilateral knee  Medications (Left):  80 mg triamcinolone acetonide 40 mg/mL     After obtaining verbal informed consent both of the patient's knees were prepped aseptically and injected through an inferior lateral approach using 40 mg of triamcinolone and 1 cc of 1% plain Xylocaine.  The patient was warned about postinjection flare and how to manage it with ice, rest and over-the-counter analgesics.  They're advised to contact me for any severe, uncontrolled pain.

## 2022-07-28 ENCOUNTER — PATIENT MESSAGE (OUTPATIENT)
Dept: ORTHOPEDICS | Facility: CLINIC | Age: 52
End: 2022-07-28
Payer: MEDICAID

## 2022-09-01 ENCOUNTER — HOSPITAL ENCOUNTER (EMERGENCY)
Facility: HOSPITAL | Age: 52
Discharge: HOME | End: 2022-09-01
Attending: EMERGENCY MEDICINE
Payer: MEDICAID

## 2022-09-01 VITALS
WEIGHT: 293 LBS | HEART RATE: 79 BPM | SYSTOLIC BLOOD PRESSURE: 125 MMHG | OXYGEN SATURATION: 95 % | DIASTOLIC BLOOD PRESSURE: 75 MMHG | RESPIRATION RATE: 20 BRPM | BODY MASS INDEX: 60.46 KG/M2 | TEMPERATURE: 98 F

## 2022-09-01 DIAGNOSIS — M25.561 CHRONIC PAIN OF RIGHT KNEE: Primary | ICD-10-CM

## 2022-09-01 DIAGNOSIS — G89.29 CHRONIC PAIN OF RIGHT KNEE: Primary | ICD-10-CM

## 2022-09-01 PROCEDURE — 25000003 PHARM REV CODE 250

## 2022-09-01 PROCEDURE — 99283 EMERGENCY DEPT VISIT LOW MDM: CPT

## 2022-09-01 RX ORDER — HYDROCODONE BITARTRATE AND ACETAMINOPHEN 5; 325 MG/1; MG/1
1 TABLET ORAL
Status: COMPLETED | OUTPATIENT
Start: 2022-09-01 | End: 2022-09-01

## 2022-09-01 RX ORDER — CYCLOBENZAPRINE HCL 10 MG
5 TABLET ORAL 3 TIMES DAILY PRN
Qty: 15 TABLET | Refills: 0 | Status: SHIPPED | OUTPATIENT
Start: 2022-09-01 | End: 2022-09-06

## 2022-09-01 RX ADMIN — HYDROCODONE BITARTRATE AND ACETAMINOPHEN 1 TABLET: 5; 325 TABLET ORAL at 02:09

## 2022-09-01 NOTE — ED PROVIDER NOTES
"Encounter Date: 9/1/2022       History     Chief Complaint   Patient presents with    Leg Pain     Pt reports having to climb stairs at work last 2 days due to elevator being out. Pt has right leg pain and is unable to bear weight on that leg. Pt reports feeling popping in her right knee.      Patient is a 51 year old with a history of chronic knee pain, arthritis, HTN, and hyperthyroidism who presents to the ED with right knee pain onset today. Patient reports her right knee "gave out" after walking up 2 flights of stairs today at work. She is unable to bear weight on her right knee. Patient had bilateral steroid injections 7/26/22 with Dr. Rogel. No injury.    Review of patient's allergies indicates:   Allergen Reactions    Penicillins Other (See Comments)     Causes throat swelling    Nsaids (non-steroidal anti-inflammatory drug)      Can't not take due to bariatric surgery      Past Medical History:   Diagnosis Date    Arthritis     High cholesterol     Hypertension     Hyperthyroidism      Past Surgical History:   Procedure Laterality Date    CHOLECYSTECTOMY      HYSTERECTOMY       No family history on file.  Social History     Tobacco Use    Smoking status: Never   Substance Use Topics    Alcohol use: No    Drug use: No     Review of Systems   Constitutional:  Negative for fever.   HENT:  Negative for sore throat.    Respiratory:  Negative for shortness of breath.    Cardiovascular:  Negative for chest pain.   Gastrointestinal:  Negative for nausea.   Genitourinary:  Negative for dysuria.   Musculoskeletal:  Positive for gait problem. Negative for back pain.        Right knee pain   Skin:  Negative for rash.   Neurological:  Negative for weakness.   Hematological:  Does not bruise/bleed easily.     Physical Exam     Initial Vitals [09/01/22 1341]   BP Pulse Resp Temp SpO2   125/75 79 18 98.4 °F (36.9 °C) 95 %      MAP       --         Physical Exam    Nursing note and vitals reviewed.  Constitutional: She " appears well-developed and well-nourished. No distress.   HENT:   Head: Normocephalic and atraumatic.   Eyes: Pupils are equal, round, and reactive to light.   Neck: Neck supple.   Cardiovascular:  Normal rate and regular rhythm.           Pulmonary/Chest: Breath sounds normal. No respiratory distress.   Musculoskeletal:      Cervical back: Neck supple.      Comments: Right knee pain with extension  Range of motion limited  No calf swelling or tenderness  Pulses 2+     Neurological: She is alert and oriented to person, place, and time.   Skin: Skin is warm and dry.   Psychiatric: She has a normal mood and affect. Her behavior is normal.       ED Course   Procedures  Labs Reviewed - No data to display       Imaging Results    None          Medications   HYDROcodone-acetaminophen 5-325 mg per tablet 1 tablet (1 tablet Oral Given 9/1/22 1417)     Medical Decision Making:   Initial Assessment:   Pt is a 51 year old female with a history of chronic knee pain, arthritis, HTN, and hyperthyroidism who presents to the ED with right knee pain onset today. Unable to bear weight. PT had bilateral CSI injections 7/26/22. No injury. Pain with right knee extension. No calf pain or swelling. Pulses 2+  Differential Diagnosis:   Differential diagnosis includes but is not limited to:  Osteoarthritis, bursitis, muscle strain, baker's cyst, abrasion, soft tissue contusion    ED Management:  Right knee pain  -Afebrile, vital signs stable, no apparent distress  -Norco 5 in the ED    Plan:  -Take flexeril as prescribed   -F/u with orthopedics  -Patient is in stable condition to be discharged home. Advised patient to follow up with primary care doctor and return to the ED if experiencing any worsening of symptoms.    Leyla Hartman PA-C  Emergency Medicine                    Clinical Impression:   Final diagnoses:  [M25.561, G89.29] Chronic pain of right knee (Primary)        ED Disposition Condition    Discharge Stable          ED  Prescriptions       Medication Sig Dispense Start Date End Date Auth. Provider    cyclobenzaprine (FLEXERIL) 10 MG tablet Take 0.5 tablets (5 mg total) by mouth 3 (three) times daily as needed for Muscle spasms. 15 tablet 9/1/2022 9/6/2022 Leyla Hartman PA-C          Follow-up Information       Follow up With Specialties Details Why Contact Info    Mp Howard NP Nephrology Schedule an appointment as soon as possible for a visit  As needed [address]  [address]  [facility] [id number] [phone]      Orthopedic doctor  Schedule an appointment as soon as possible for a visit                Leyla Hartman PA-C  09/01/22 6627

## 2022-09-01 NOTE — DISCHARGE INSTRUCTIONS
-Follow up with orthopedic doctor  -Thank you for letting myself and our team take care for you today! It was nice meeting you, and I hope you feel better very soon. Please come back to [facility] for all of your future medical needs.   Our goal in the ER is to always give you outstanding care and exceptional service. You may receive a survey by mail or email in the next week about your experience in our ED. We would greatly appreciate you completing and returning the survey. Your feedback provides us with a way to recognize our staff who give very good care and it helps us learn how to improve when your experience was below our aspiration of excellence.     Sincerely,     Leyla Hartman PA-C  Emergency Room Physician Assistant  [facility]

## 2022-09-01 NOTE — Clinical Note
"Lisa Zarate" Viky was seen and treated in our emergency department on 9/1/2022.  She may return to work on 09/05/2022.       If you have any questions or concerns, please don't hesitate to call.      Zach Richter LPN    "

## 2022-09-01 NOTE — Clinical Note
"Lisa Zarate" Viky was seen and treated in our emergency department on 9/1/2022.  She may return to work on 09/02/2022.       If you have any questions or concerns, please don't hesitate to call.      Leyla Hartman PA-C"

## 2022-09-07 ENCOUNTER — PATIENT OUTREACH (OUTPATIENT)
Dept: EMERGENCY MEDICINE | Facility: HOSPITAL | Age: 52
End: 2022-09-07
Payer: MEDICAID

## 2022-09-07 NOTE — PROGRESS NOTES
Spoke with patient and she stated she was not doing too well. Patient was asked if she had been to see her PCP she stated she had been to see her PCP and her Orthopedic. Patient denied needing any other assistance at this time.

## 2023-02-15 ENCOUNTER — TELEPHONE (OUTPATIENT)
Dept: ORTHOPEDICS | Facility: CLINIC | Age: 53
End: 2023-02-15
Payer: MEDICAID

## 2023-02-15 NOTE — TELEPHONE ENCOUNTER
----- Message from Maru Del Castillo sent at 2/15/2023 12:21 PM CST -----  Regarding: Call back  Contact: [phone]  Who Called: PT     Type:  Sooner Apoointment Request    Caller is requesting a sooner appointment.  Caller declined first available appointment listed below.  Caller will not accept being placed on the waitlist and is requesting a message be sent to doctor.  Name of Caller: PT   When is the first available appointment? April   Symptoms: Needs to schedule injections on both knees   Would the patient rather a call back or a response via [facility]? Call back   Best Call Back Number: [phone]  Additional Information:

## 2023-05-04 RX ORDER — FLUTICASONE PROPIONATE 50 MCG
SPRAY, SUSPENSION (ML) NASAL
Qty: 16 G | Refills: 0 | Status: SHIPPED | OUTPATIENT
Start: 2023-05-04 | End: 2023-05-11

## 2023-05-08 DIAGNOSIS — M17.11 PRIMARY OSTEOARTHRITIS OF RIGHT KNEE: Primary | ICD-10-CM

## 2023-05-09 ENCOUNTER — OFFICE VISIT (OUTPATIENT)
Dept: ORTHOPEDICS | Facility: CLINIC | Age: 53
End: 2023-05-09
Payer: MEDICAID

## 2023-05-09 VITALS — HEIGHT: 67 IN | BODY MASS INDEX: 45.99 KG/M2 | WEIGHT: 293 LBS

## 2023-05-09 DIAGNOSIS — M17.0 PRIMARY OSTEOARTHRITIS OF BOTH KNEES: Primary | ICD-10-CM

## 2023-05-09 PROCEDURE — 99214 OFFICE O/P EST MOD 30 MIN: CPT | Mod: S$PBB,25,, | Performed by: ORTHOPAEDIC SURGERY

## 2023-05-09 PROCEDURE — 20610 LARGE JOINT ASPIRATION/INJECTION: BILATERAL KNEE: ICD-10-PCS | Mod: 50,S$PBB,, | Performed by: ORTHOPAEDIC SURGERY

## 2023-05-09 PROCEDURE — 1159F PR MEDICATION LIST DOCUMENTED IN MEDICAL RECORD: ICD-10-PCS | Mod: CPTII,,, | Performed by: ORTHOPAEDIC SURGERY

## 2023-05-09 PROCEDURE — 1160F PR REVIEW ALL MEDS BY PRESCRIBER/CLIN PHARMACIST DOCUMENTED: ICD-10-PCS | Mod: CPTII,,, | Performed by: ORTHOPAEDIC SURGERY

## 2023-05-09 PROCEDURE — 20610 DRAIN/INJ JOINT/BURSA W/O US: CPT | Mod: 50,PBBFAC,PN | Performed by: ORTHOPAEDIC SURGERY

## 2023-05-09 PROCEDURE — 3008F PR BODY MASS INDEX (BMI) DOCUMENTED: ICD-10-PCS | Mod: CPTII,,, | Performed by: ORTHOPAEDIC SURGERY

## 2023-05-09 PROCEDURE — 99213 OFFICE O/P EST LOW 20 MIN: CPT | Mod: PBBFAC,PN | Performed by: ORTHOPAEDIC SURGERY

## 2023-05-09 PROCEDURE — 1159F MED LIST DOCD IN RCRD: CPT | Mod: CPTII,,, | Performed by: ORTHOPAEDIC SURGERY

## 2023-05-09 PROCEDURE — 3008F BODY MASS INDEX DOCD: CPT | Mod: CPTII,,, | Performed by: ORTHOPAEDIC SURGERY

## 2023-05-09 PROCEDURE — 1160F RVW MEDS BY RX/DR IN RCRD: CPT | Mod: CPTII,,, | Performed by: ORTHOPAEDIC SURGERY

## 2023-05-09 PROCEDURE — 99999 PR PBB SHADOW E&M-EST. PATIENT-LVL III: CPT | Mod: PBBFAC,,, | Performed by: ORTHOPAEDIC SURGERY

## 2023-05-09 PROCEDURE — 99214 PR OFFICE/OUTPT VISIT, EST, LEVL IV, 30-39 MIN: ICD-10-PCS | Mod: S$PBB,25,, | Performed by: ORTHOPAEDIC SURGERY

## 2023-05-09 PROCEDURE — 99999 PR PBB SHADOW E&M-EST. PATIENT-LVL III: ICD-10-PCS | Mod: PBBFAC,,, | Performed by: ORTHOPAEDIC SURGERY

## 2023-05-09 RX ORDER — TRIAMCINOLONE ACETONIDE 40 MG/ML
80 INJECTION, SUSPENSION INTRA-ARTICULAR; INTRAMUSCULAR
Status: DISCONTINUED | OUTPATIENT
Start: 2023-05-09 | End: 2023-05-09 | Stop reason: HOSPADM

## 2023-05-09 RX ADMIN — TRIAMCINOLONE ACETONIDE 80 MG: 40 INJECTION, SUSPENSION INTRA-ARTICULAR; INTRAMUSCULAR at 03:05

## 2023-05-09 NOTE — PROGRESS NOTES
Subjective:      Patient ID: Lisa Salmon is a 52 y.o. female.    Chief Complaint: Knee Pain (bilateral ) and Injections (bilataral knee )    HPI    Follow-up for osteoarthritis.  The patient has been treated with NSAIDs and over-the-counter injections.  She reports modest benefit.  She has pain with prolonged ambulation and difficulty managing her work responsibilities because of her knee pain.  Patient states that her right knee is more painful than the left.          Review of Systems   Constitutional: Negative for fever and weight loss.   HENT:  Negative for congestion.    Eyes:  Negative for visual disturbance.   Cardiovascular:  Negative for chest pain.   Respiratory:  Negative for shortness of breath.    Hematologic/Lymphatic: Negative for bleeding problem. Does not bruise/bleed easily.   Skin:  Negative for poor wound healing.   Musculoskeletal:  Positive for joint pain.   Gastrointestinal:  Negative for abdominal pain.   Genitourinary:  Negative for dysuria.   Neurological:  Negative for seizures.   Psychiatric/Behavioral:  Negative for altered mental status.    Allergic/Immunologic: Negative for persistent infections.       Objective:      Ortho/SPM Exam      Right knee    [unfilled]    The patient is not in acute distress.   Sclerae normal  Body habitus is obese.  Respiratory distress:  none   The patient walks with a limp.  Hip irritability  negative.   The skin over the knee is intact.  Knee effusion 1+  Palpation- tender joint line  Range of motion- 0-120  Ligament laxity exam:  2+ valgus laxity   Patellar apprehension negative.  Popliteal cyst negative  Patellar crepitation present.  Meniscal irritability not applicable  Pulses DP present, PT present.  Motor normal 5/5 strength in all tested muscle groups.   Sensory normal.    The left knee is identical    I reviewed the relevant imaging for the patient's condition:  Radiographs of both knees show complete loss of medial joint space with sclerosis,  osteophytes and subluxation.  Kellgren stage IV        Assessment:       Encounter Diagnosis   Name Primary?    Primary osteoarthritis of both knees Yes        The condition is structurally advanced with significant pain and functional impairment in both knees.  The patient is receiving marginal relief with nonsurgical measures and her symptomatology is progressing.  She has impairment of ADLs and work capacity          Plan:       Lisa was seen today for knee pain and injections.    Diagnoses and all orders for this visit:    Primary osteoarthritis of both knees          I explained my diagnostic impression and the reasoning behind it in detail, using layman's terms.  Models and/or pictures were used to help in the explanation.  The patient is reaching a point in her care such that the only meaningful intervention is likely to be total knee replacement.    I explained the role of knee replaced in the treatment of this condition.  I also explained the typical clinical course.  The usual complications that that can occur were also discussed.  The patient I agree to discuss this again at follow-up, if clinically warranted.    Injections requested and consent given

## 2023-05-09 NOTE — PROCEDURES
Large Joint Aspiration/Injection: bilateral knee    Date/Time: 5/9/2023 3:00 PM  Performed by: Jose Rogel MD  Authorized by: Jose Rogel MD     Consent Done?:  Yes (Verbal)  Approach:  Anterolateral  Location:  Knee  Laterality:  Bilateral  Site:  Bilateral knee  Medications (Left):  80 mg triamcinolone acetonide 40 mg/mL     After obtaining verbal informed consent both of the patient's knees were prepped aseptically and injected through an inferior lateral approach using 40 mg of triamcinolone and 1 cc of 1% plain Xylocaine.  The patient was warned about postinjection flare and how to manage it with ice, rest and over-the-counter analgesics.  They're advised to contact me for any severe, uncontrolled pain.

## 2023-05-11 ENCOUNTER — PATIENT MESSAGE (OUTPATIENT)
Dept: ORTHOPEDICS | Facility: CLINIC | Age: 53
End: 2023-05-11
Payer: MEDICAID

## 2023-05-23 ENCOUNTER — PATIENT MESSAGE (OUTPATIENT)
Dept: ORTHOPEDICS | Facility: CLINIC | Age: 53
End: 2023-05-23

## 2023-05-23 ENCOUNTER — OFFICE VISIT (OUTPATIENT)
Dept: ORTHOPEDICS | Facility: CLINIC | Age: 53
End: 2023-05-23
Payer: MEDICAID

## 2023-05-23 VITALS — BODY MASS INDEX: 45.99 KG/M2 | HEIGHT: 67 IN | WEIGHT: 293 LBS

## 2023-05-23 DIAGNOSIS — Z96.651 S/P TOTAL KNEE ARTHROPLASTY, RIGHT: Primary | ICD-10-CM

## 2023-05-23 DIAGNOSIS — M17.0 PRIMARY OSTEOARTHRITIS OF BOTH KNEES: Primary | ICD-10-CM

## 2023-05-23 PROCEDURE — 3008F PR BODY MASS INDEX (BMI) DOCUMENTED: ICD-10-PCS | Mod: CPTII,,, | Performed by: ORTHOPAEDIC SURGERY

## 2023-05-23 PROCEDURE — 99213 OFFICE O/P EST LOW 20 MIN: CPT | Mod: PBBFAC,PN | Performed by: ORTHOPAEDIC SURGERY

## 2023-05-23 PROCEDURE — 99214 PR OFFICE/OUTPT VISIT, EST, LEVL IV, 30-39 MIN: ICD-10-PCS | Mod: S$PBB,,, | Performed by: ORTHOPAEDIC SURGERY

## 2023-05-23 PROCEDURE — 1159F PR MEDICATION LIST DOCUMENTED IN MEDICAL RECORD: ICD-10-PCS | Mod: CPTII,,, | Performed by: ORTHOPAEDIC SURGERY

## 2023-05-23 PROCEDURE — 99214 OFFICE O/P EST MOD 30 MIN: CPT | Mod: S$PBB,,, | Performed by: ORTHOPAEDIC SURGERY

## 2023-05-23 PROCEDURE — 99999 PR PBB SHADOW E&M-EST. PATIENT-LVL III: ICD-10-PCS | Mod: PBBFAC,,, | Performed by: ORTHOPAEDIC SURGERY

## 2023-05-23 PROCEDURE — 1160F PR REVIEW ALL MEDS BY PRESCRIBER/CLIN PHARMACIST DOCUMENTED: ICD-10-PCS | Mod: CPTII,,, | Performed by: ORTHOPAEDIC SURGERY

## 2023-05-23 PROCEDURE — 1160F RVW MEDS BY RX/DR IN RCRD: CPT | Mod: CPTII,,, | Performed by: ORTHOPAEDIC SURGERY

## 2023-05-23 PROCEDURE — 99999 PR PBB SHADOW E&M-EST. PATIENT-LVL III: CPT | Mod: PBBFAC,,, | Performed by: ORTHOPAEDIC SURGERY

## 2023-05-23 PROCEDURE — 3008F BODY MASS INDEX DOCD: CPT | Mod: CPTII,,, | Performed by: ORTHOPAEDIC SURGERY

## 2023-05-23 PROCEDURE — 1159F MED LIST DOCD IN RCRD: CPT | Mod: CPTII,,, | Performed by: ORTHOPAEDIC SURGERY

## 2023-05-23 RX ORDER — PANTOPRAZOLE SODIUM 40 MG/1
40 TABLET, DELAYED RELEASE ORAL DAILY
COMMUNITY
Start: 2023-05-09

## 2023-05-23 RX ORDER — IBUPROFEN 800 MG/1
800 TABLET ORAL 3 TIMES DAILY
Status: ON HOLD | COMMUNITY
Start: 2023-05-15 | End: 2023-08-07 | Stop reason: HOSPADM

## 2023-05-23 RX ORDER — ESTRADIOL 2 MG/1
2 TABLET ORAL DAILY
COMMUNITY

## 2023-05-23 NOTE — PROGRESS NOTES
Subjective:      Patient ID: Lisa Salmon is a 52 y.o. female.    Chief Complaint: Knee Pain (bilateral )    HPI    Follow-up for osteoarthritis.  The patient reports moderate relief from her past injection.  She notes pain with all community ambulation and limitation of even household ADLs    The patient also complains of poorly localized right hip pain          Review of Systems   Constitutional: Negative for fever and weight loss.   HENT:  Negative for congestion.    Eyes:  Negative for visual disturbance.   Cardiovascular:  Negative for chest pain.   Respiratory:  Negative for shortness of breath.    Hematologic/Lymphatic: Negative for bleeding problem. Does not bruise/bleed easily.   Skin:  Negative for poor wound healing.   Gastrointestinal:  Negative for abdominal pain.   Genitourinary:  Negative for dysuria.   Neurological:  Negative for seizures.   Psychiatric/Behavioral:  Negative for altered mental status.    Allergic/Immunologic: Negative for persistent infections.       Objective:      Ortho/SPM Exam      Right knee     The patient is not in acute distress.   Sclerae normal  Body habitus is obese.  Respiratory distress:  none   The patient walks with a limp.  Hip irritability  negative.   The skin over the knee is intact.  Knee effusion 1+  Palpation- tender joint line  Range of motion- 0-120  Ligament laxity exam:  2+ valgus laxity   Patellar apprehension negative.  Popliteal cyst negative  Patellar crepitation present.  Meniscal irritability not applicable  Pulses DP present, PT present.  Motor normal 5/5 strength in all tested muscle groups.   Sensory normal.     The left knee is identical    I reviewed the relevant imaging for the patient's condition:  Previous radiographs of both knees show advanced OA.    Recent albumin noted to be 2.8          Assessment:       Encounter Diagnosis   Name Primary?    Primary osteoarthritis of both knees Yes        The arthritic condition is structurally advanced.   Progression is likely.  The only intervention likely to be of meaningful, functional benefit is arthroplasty.          Plan:       Lisa was seen today for knee pain.    Diagnoses and all orders for this visit:    Primary osteoarthritis of both knees          I explained my diagnostic impression and the reasoning behind it in detail, using layman's terms.  Models and/or pictures were used to help in the explanation.    Check right hip x-ray prior to scheduling the surgery    Treatment options were discussed. The surgical process of right knee replacement was discussed in detail with the patient including a detailed discussion of the procedure itself (including visual model, x-ray review, and literature review). The typical perioperative and post-operative course was discussed and perioperative risks were discussed to the patient's satisfaction.  Risks and complications discussed included but were not limited to the risks of anesthetic complications, infection, bleeding, wound healing complications, stiffness, aseptic loosening, instability, limb length inequality, neurologic dysfunction including numbness and weakness, additional surgery,  DVT, pulmonary embolism, perioperative medical risks (cardiac, pulmonary, renal, neurologic), and death and the patient elects to proceed.       Appropriate nutrition was discussed.    We will need to recheck her albumin preop

## 2023-05-24 ENCOUNTER — TELEPHONE (OUTPATIENT)
Dept: ORTHOPEDICS | Facility: CLINIC | Age: 53
End: 2023-05-24
Payer: MEDICAID

## 2023-06-16 ENCOUNTER — PATIENT MESSAGE (OUTPATIENT)
Dept: PODIATRY | Facility: CLINIC | Age: 53
End: 2023-06-16
Payer: MEDICAID

## 2023-07-14 ENCOUNTER — OFFICE VISIT (OUTPATIENT)
Dept: ORTHOPEDICS | Facility: CLINIC | Age: 53
End: 2023-07-14
Payer: MEDICAID

## 2023-07-14 ENCOUNTER — PATIENT MESSAGE (OUTPATIENT)
Dept: ORTHOPEDICS | Facility: CLINIC | Age: 53
End: 2023-07-14

## 2023-07-14 ENCOUNTER — NURSE TRIAGE (OUTPATIENT)
Dept: ADMINISTRATIVE | Facility: CLINIC | Age: 53
End: 2023-07-14
Payer: MEDICAID

## 2023-07-14 VITALS — BODY MASS INDEX: 45.99 KG/M2 | WEIGHT: 293 LBS | HEIGHT: 67 IN

## 2023-07-14 DIAGNOSIS — M17.11 PRIMARY OSTEOARTHRITIS OF RIGHT KNEE: ICD-10-CM

## 2023-07-14 DIAGNOSIS — Z01.818 PREOP TESTING: ICD-10-CM

## 2023-07-14 DIAGNOSIS — Z96.651 S/P TOTAL KNEE ARTHROPLASTY, RIGHT: Primary | ICD-10-CM

## 2023-07-14 PROCEDURE — 3008F BODY MASS INDEX DOCD: CPT | Mod: CPTII,,, | Performed by: ORTHOPAEDIC SURGERY

## 2023-07-14 PROCEDURE — 99215 PR OFFICE/OUTPT VISIT, EST, LEVL V, 40-54 MIN: ICD-10-PCS | Mod: S$PBB,,, | Performed by: ORTHOPAEDIC SURGERY

## 2023-07-14 PROCEDURE — 99999 PR PBB SHADOW E&M-EST. PATIENT-LVL V: ICD-10-PCS | Mod: PBBFAC,,, | Performed by: ORTHOPAEDIC SURGERY

## 2023-07-14 PROCEDURE — 1160F PR REVIEW ALL MEDS BY PRESCRIBER/CLIN PHARMACIST DOCUMENTED: ICD-10-PCS | Mod: CPTII,,, | Performed by: ORTHOPAEDIC SURGERY

## 2023-07-14 PROCEDURE — 1159F PR MEDICATION LIST DOCUMENTED IN MEDICAL RECORD: ICD-10-PCS | Mod: CPTII,,, | Performed by: ORTHOPAEDIC SURGERY

## 2023-07-14 PROCEDURE — 3044F HG A1C LEVEL LT 7.0%: CPT | Mod: CPTII,,, | Performed by: ORTHOPAEDIC SURGERY

## 2023-07-14 PROCEDURE — 99215 OFFICE O/P EST HI 40 MIN: CPT | Mod: S$PBB,,, | Performed by: ORTHOPAEDIC SURGERY

## 2023-07-14 PROCEDURE — 3008F PR BODY MASS INDEX (BMI) DOCUMENTED: ICD-10-PCS | Mod: CPTII,,, | Performed by: ORTHOPAEDIC SURGERY

## 2023-07-14 PROCEDURE — 99215 OFFICE O/P EST HI 40 MIN: CPT | Mod: PBBFAC,PN | Performed by: ORTHOPAEDIC SURGERY

## 2023-07-14 PROCEDURE — 3044F PR MOST RECENT HEMOGLOBIN A1C LEVEL <7.0%: ICD-10-PCS | Mod: CPTII,,, | Performed by: ORTHOPAEDIC SURGERY

## 2023-07-14 PROCEDURE — 1159F MED LIST DOCD IN RCRD: CPT | Mod: CPTII,,, | Performed by: ORTHOPAEDIC SURGERY

## 2023-07-14 PROCEDURE — 99999 PR PBB SHADOW E&M-EST. PATIENT-LVL V: CPT | Mod: PBBFAC,,, | Performed by: ORTHOPAEDIC SURGERY

## 2023-07-14 PROCEDURE — 1160F RVW MEDS BY RX/DR IN RCRD: CPT | Mod: CPTII,,, | Performed by: ORTHOPAEDIC SURGERY

## 2023-07-14 NOTE — H&P
Subjective:       Patient ID: Lisa Salmon is a 52 y.o. female.    Chief Complaint: Pain of the Right Knee (Patient presents today for her pre-op visit for her right knee. And would like to have an injection in her left knee. )      Lisa Salmon is a 52 y.o. female with PMHx significant for right knee osteoarthritis. They are here today for a patient optimization in preparation for a Right total knee arthroplasty to be performed by  Dr. Rogel on 8/7/23.  Lisa Salmon has a several year history of Right knee pain.  Pain is worse with activity and weight bearing. Patient has experienced interference of ADLs due to increased pain and decreased range of motion. Patient has failed non-operative treatment including NSAIDs, activity modification, and corticosteroid injections. Lisa Salmon ambulates without the use of cane.  There has been no significant change in medical status since last visit.  She has not been cleared by her PCP yet.    Past Medical History:   Diagnosis Date    Arthritis     High cholesterol     Hypertension     Hyperthyroidism      Past Surgical History:   Procedure Laterality Date    CHOLECYSTECTOMY      HYSTERECTOMY       No family history on file.  Social History     Socioeconomic History    Marital status:    Tobacco Use    Smoking status: Never   Substance and Sexual Activity    Alcohol use: No    Drug use: No    Sexual activity: Yes     Birth control/protection: Surgical     Social Determinants of Health     Financial Resource Strain: Low Risk     Difficulty of Paying Living Expenses: Not very hard   Food Insecurity: No Food Insecurity    Worried About Running Out of Food in the Last Year: Never true    Ran Out of Food in the Last Year: Never true   Transportation Needs: No Transportation Needs    Lack of Transportation (Medical): No    Lack of Transportation (Non-Medical): No   Physical Activity: Inactive    Days of Exercise per Week: 0 days    Minutes of Exercise per Session: 0 min    Stress: Stress Concern Present    Feeling of Stress : To some extent   Social Connections: Moderately Isolated    Frequency of Communication with Friends and Family: More than three times a week    Frequency of Social Gatherings with Friends and Family: More than three times a week    Attends [employer]ism Services: Never    Active Member of Clubs or Organizations: No    Attends Club or Organization Meetings: Never    Marital Status:    Housing Stability: Unknown    Unable to Pay for Housing in the Last Year: No    Unstable Housing in the Last Year: No       Current Outpatient Medications   Medication Sig Dispense Refill    amlodipine (NORVASC) 5 MG tablet Take 10 mg by mouth once daily.       ARIPiprazole (ABILIFY) 15 MG Tab Take 15 mg by mouth once daily.      buPROPion (WELLBUTRIN XL) 300 MG 24 hr tablet Take 300 mg by mouth once daily.      citalopram (CELEXA) 10 MG tablet Take 60 mg by mouth once daily.      clonazePAM (KLONOPIN) 1 MG tablet Take 1 mg by mouth 3 (three) times daily as needed.      diclofenac sodium (VOLTAREN ARTHRITIS PAIN) 1 % Gel Apply 2 g topically 4 (four) times daily. 200 g 0    estradioL (ESTRACE) 2 MG tablet Take 2 mg by mouth once daily.      folic acid (FOLVITE) 1 MG tablet Take 1,000 mcg by mouth once daily.      gabapentin (NEURONTIN) 400 MG capsule Take 400 mg by mouth 3 (three) times daily.      ibuprofen (ADVIL,MOTRIN) 800 MG tablet Take 800 mg by mouth 3 (three) times daily.      levothyroxine (SYNTHROID) 50 MCG tablet Take 50 mcg by mouth once daily.      NON FORMULARY MEDICATION B12 plus  D3/K2 Patch  Multivitamin Patch      ondansetron (ZOFRAN-ODT) 4 MG TbDL Take 1 tablet (4 mg total) by mouth every 6 (six) hours as needed (nausea). 10 tablet 0    pantoprazole (PROTONIX) 40 MG tablet Take 40 mg by mouth.      VITAMIN D2 1,250 mcg (50,000 unit) capsule Take 50,000 Units by mouth every 7 days.       No current facility-administered medications for this visit.     Review of  "patient's allergies indicates:   Allergen Reactions    Penicillins Other (See Comments)     Causes throat swelling    Nsaids (non-steroidal anti-inflammatory drug)      Can't not take due to bariatric surgery        Review of Systems   Constitutional:  Negative for chills and fatigue.   HENT:  Negative for trouble swallowing.    Eyes:  Negative for visual disturbance.   Respiratory:  Negative for cough, chest tightness and shortness of breath.    Cardiovascular:  Positive for leg swelling. Negative for chest pain and palpitations.   Gastrointestinal:  Negative for blood in stool, diarrhea and vomiting.   Genitourinary:  Negative for dysuria and hematuria.   Skin:  Negative for rash and wound.   Neurological:  Positive for light-headedness. Negative for dizziness and syncope.   Psychiatric/Behavioral:  Negative for agitation, behavioral problems and confusion.      Objective:      Vitals:    07/14/23 1301   Weight: (!) 160.1 kg (353 lb)   Height: 5' 7" (1.702 m)     Physical Exam  Vitals reviewed.   Constitutional:       General: She is not in acute distress.     Appearance: Normal appearance.   HENT:      Head: Normocephalic.      Mouth/Throat:      Mouth: Mucous membranes are moist.   Eyes:      Pupils: Pupils are equal, round, and reactive to light.   Cardiovascular:      Rate and Rhythm: Normal rate and regular rhythm.   Pulmonary:      Effort: No respiratory distress.      Breath sounds: No stridor. No wheezing, rhonchi or rales.   Musculoskeletal:      Right lower leg: No edema.      Left lower leg: No edema.   Skin:     General: Skin is warm and dry.      Capillary Refill: Capillary refill takes less than 2 seconds.   Neurological:      Mental Status: She is oriented to person, place, and time.   Psychiatric:         Mood and Affect: Mood normal.         Behavior: Behavior normal.       Lab Review: CBC:   Lab Results   Component Value Date    WBC 14.56 (H) 05/23/2021    RBC 4.52 05/23/2021    HGB 12.9 " "05/23/2021    HCT 43.3 05/23/2021     05/23/2021     BMP:   Lab Results   Component Value Date     (H) 05/23/2021     (L) 05/23/2021    K 4.3 05/23/2021     05/23/2021    CO2 17 (L) 05/23/2021    BUN 13 05/23/2021    CREATININE 1.1 05/23/2021    CALCIUM 8.0 (L) 05/23/2021     Diagnostics Review: ECG: Reviewed     Assessment:       1. S/P total knee arthroplasty, right    2. Primary osteoarthritis of right knee    3. Preop testing        Plan:       Orders Placed This Encounter   Procedures    WALKER FOR HOME USE     Order Specific Question:   Type of Walker:     Answer:   Adult (5'4"-6'6")     Order Specific Question:   With wheels?     Answer:   Yes     Order Specific Question:   Height:     Answer:   5' 7" (1.702 m)     Order Specific Question:   Weight:     Answer:   160.1 kg (353 lb)     Order Specific Question:   Length of need (1-99 months):     Answer:   99     Order Specific Question:   Please check all that apply:     Answer:   Patient is unable to safely ambulate without equipment.    BATH/SHOWER CHAIR FOR HOME USE     Order Specific Question:   Height:     Answer:   5' 7" (1.702 m)     Order Specific Question:   Weight:     Answer:   160.1 kg (353 lb)     Order Specific Question:   Length of need (1-99 months):     Answer:   99     Order Specific Question:   Type:     Answer:   With back    CBC Auto Differential     Standing Status:   Future     Number of Occurrences:   1     Standing Expiration Date:   9/11/2024    Comprehensive Metabolic Panel     Standing Status:   Future     Number of Occurrences:   1     Standing Expiration Date:   9/11/2024    Hemoglobin A1C     Standing Status:   Future     Number of Occurrences:   1     Standing Expiration Date:   9/11/2024    Ambulatory referral/consult to Home Health     Standing Status:   Future     Standing Expiration Date:   8/14/2024     Referral Priority:   Routine     Referral Type:   Home Health     Referral Reason:   Specialty " Services Required     Requested Specialty:   Home Health Services     Number of Visits Requested:   1    SCHEDULED EKG 12-LEAD (to Muse)     Standing Status:   Future     Number of Occurrences:   1     Standing Expiration Date:   7/14/2024           During today's Patient Optimization Visit all consultant notes, medications, imaging, EKG, and lab work has been reviewed. Discharge planning was discussed and Home Health has been ordered. Any additional testing or consults needed for medical clearance has been ordered. Pre, marimar, and post operative procedures and expectations discussed. All questions were answered.   Lisa Salmon will contact us if there are any questions, concerns, or changes in medical status prior to surgery.    A total of 40 minutes was spent for this appt today.    This includes face to face time and non-face to face time preparing to see the patient (eg, review of tests), obtaining and/or reviewing separately obtained history, documenting clinical information in the electronic or other health record, independently interpreting results and communicating results to the patient/family/caregiver, or care coordinator.

## 2023-07-14 NOTE — PATIENT INSTRUCTIONS
We would like to schedule a few appointments before we proceed with surgery.  These would include a joint camp which is a power point presentation going over what to expect after surgery.  You will also have an appointment with anesthesia to go over your medical history.  These appointments are usually on the same day.  Someone should be reaching out to you in the next few days to schedule these.    We also need due to schedule an appointment with your primary care provider for surgical clearance.  This is something that you need to schedule.    I hope I was able to put your mind a little more at ease today.  If you have any questions or concerns, please do not hesitate to reach out to Dr. Rogel's staff so they can be addressed.

## 2023-07-15 NOTE — TELEPHONE ENCOUNTER
Pt is calling with concerns about the results of her EKG performed earlier today. Pt denies any symptoms such as pain in her chest, jaw, or arm. She denies new or worsening SOB. Reviewed the EKG interpretation; Dr. Jean-Baptiste noted the abnormal EKG and addressed it in his report. Care advice is for patient to contact her provider's office when they are open to further discuss the results. Advised to call back if she begins experiencing SOB, chest pain, jaw or arm pain. Pt verbalized understanding.     Reason for Disposition   Caller requesting routine or non-urgent lab result    Additional Information   Negative: ED call to PCP (i.e., primary care provider; doctor, NP, or PA)   Negative: Doctor (or NP/PA) call to PCP   Negative: Call about patient who is currently hospitalized   Negative: Lab or radiology calling with CRITICAL test results   Negative: [1] Follow-up call from patient regarding patient's clinical status AND [2] information urgent   Negative: [1] Caller requests to speak ONLY to PCP AND [2] URGENT question   Negative: [1] Caller requests to speak to PCP now AND [2] won't tell us reason for call  (Exception: If 10 pm to 6 am, caller must first discuss reason for the call.)   Negative: Notification of hospital admission   Negative: Notification of death   Negative: Caller requesting lab results  (Exception: Routine or non-urgent lab result.)   Negative: Lab or radiology calling with test results   Negative: [1] Follow-up call from patient regarding patient's clinical status AND [2] information NON-URGENT   Negative: [1] Caller requests to speak ONLY to PCP AND [2] NON-URGENT question   Negative: Caller requesting an appointment, triage offered and declined    Protocols used: PCP Call - No Triage-A-

## 2023-07-17 ENCOUNTER — TELEPHONE (OUTPATIENT)
Dept: ORTHOPEDICS | Facility: CLINIC | Age: 53
End: 2023-07-17
Payer: MEDICAID

## 2023-07-17 NOTE — TELEPHONE ENCOUNTER
----- Message from Brenda Levy sent at 7/17/2023  8:56 AM CDT -----  Type:  Needs Medical Advice    Who Called: pt  Symptoms (please be specific): needs a call back as soon as possible    Would the patient rather a call back or a response via [facility]? call  Best Call Back Number: [phone]    Additional Information:

## 2023-07-17 NOTE — TELEPHONE ENCOUNTER
Spoke with pt informed her that her message was sent over to provider about her results ..      ----- Message from Ching Driscoll sent at 7/17/2023  8:22 AM CDT -----  .Type:  Needs Medical Advice    Who Called:  pt  Symptoms (please be specific):    How long has patient had these symptoms:    Pharmacy name and phone #:    Would the patient rather a call back or a response via [facility]?   Best Call Back Number: [phone]  Additional Information:  pt wants to speak to the office about her EKG

## 2023-07-17 NOTE — TELEPHONE ENCOUNTER
----- Message from Ching Driscoll sent at 7/17/2023  8:22 AM CDT -----  .Type:  Needs Medical Advice    Who Called:  pt  Symptoms (please be specific):    How long has patient had these symptoms:    Pharmacy name and phone #:    Would the patient rather a call back or a response via [facility]?   Best Call Back Number: [phone]  Additional Information:  pt wants to speak to the office about her EKG

## 2023-07-18 DIAGNOSIS — M17.11 PRIMARY OSTEOARTHRITIS OF RIGHT KNEE: Primary | ICD-10-CM

## 2023-07-18 RX ORDER — ACETAMINOPHEN 500 MG
1000 TABLET ORAL
Status: CANCELLED | OUTPATIENT
Start: 2023-07-18 | End: 2023-07-18

## 2023-07-18 RX ORDER — MUPIROCIN 20 MG/G
OINTMENT TOPICAL
Status: CANCELLED | OUTPATIENT
Start: 2023-07-18

## 2023-07-18 RX ORDER — SODIUM CHLORIDE 0.9 % (FLUSH) 0.9 %
3 SYRINGE (ML) INJECTION EVERY 8 HOURS
Status: CANCELLED | OUTPATIENT
Start: 2023-07-18

## 2023-07-18 RX ORDER — PREGABALIN 75 MG/1
150 CAPSULE ORAL
Status: CANCELLED | OUTPATIENT
Start: 2023-07-18 | End: 2023-07-18

## 2023-07-18 RX ORDER — CEFAZOLIN SODIUM 2 G/50ML
2 SOLUTION INTRAVENOUS
Status: CANCELLED | OUTPATIENT
Start: 2023-07-18

## 2023-07-31 DIAGNOSIS — Z96.651 S/P TOTAL KNEE ARTHROPLASTY, RIGHT: Primary | ICD-10-CM

## 2023-08-04 ENCOUNTER — TELEPHONE (OUTPATIENT)
Dept: ORTHOPEDICS | Facility: CLINIC | Age: 53
End: 2023-08-04
Payer: MEDICAID

## 2023-08-04 NOTE — TELEPHONE ENCOUNTER
Spoke with Ms Viky, patient stated she had her cardiac clearance completed with Dr Petty on 08/01 and they were suppose to fax clearance over.     Left message with representative re: Cardiac Clearance.    Cardiac Risk Assessment form faxed to [facility]

## 2023-08-04 NOTE — TELEPHONE ENCOUNTER
----- Message from Marci Holley PA-C sent at 8/4/2023  8:19 AM CDT -----  Regarding: RE: Clearances for Surgery 8/7/23  William Wylie,    Can we see if we have a fax or scanned note from either the patient's PCP or Cardiologist (Dr. Petty) with recommendations for safely proceeding with joint arthroplasty?    Thanks,  Nathen    ----- Message -----  From: Kevin Bailon PA-C  Sent: 8/3/2023   1:27 PM CDT  To: Marci Holley PA-C  Subject: FW: Clearances for Surgery 8/7/23                  ----- Message -----  From: Jeanie Briones RN  Sent: 8/3/2023   9:08 AM CDT  To: Jyothi Rahman, JUAN JOSE; Michelle Wong RN; #  Subject: Clearances for Surgery 8/7/23                    Good Morning,    On Ms. Salmon, Right TKA for Monday 8/7, can you please give me an update on the status of your clearances? I spoke with the patient and was told by her that her nurse practitioner would not give her a medical clearance due to an abnormal ekg. She was sent to see cardiologist Dr. Petty where patient states ekg was normal and she also had an echo. However, I do not see where we have received either clearance.    Thanks so much,    Jeanie, Pre[facility] Nurse [facility]

## 2023-08-07 PROBLEM — Z96.651 HISTORY OF RIGHT KNEE JOINT REPLACEMENT: Status: ACTIVE | Noted: 2023-08-07

## 2023-08-09 ENCOUNTER — TELEPHONE (OUTPATIENT)
Dept: ORTHOPEDICS | Facility: CLINIC | Age: 53
End: 2023-08-09
Payer: MEDICAID

## 2023-08-09 NOTE — TELEPHONE ENCOUNTER
----- Message from Mary Roberts sent at 8/9/2023 10:00 AM CDT -----  Regarding: allergic reaction  Contact: [phone]  Type:  Needs Medical Advice    Who Called:  pt   Symptoms (please be specific):  She is in pain and lips are swelling and splitting. She did not have a reaction to the medication in the hospital. Could that medication be prescribed?     How long has patient had these symptoms:   since her surgery  Pharmacy name and phone #:   [facility] - [place], [place] - [address];  Would the patient rather a call back or a response via [facility]?  Call   Best Call Back Number:  [phone]   Additional Information:  Please send something for pain

## 2023-08-11 ENCOUNTER — TELEPHONE (OUTPATIENT)
Dept: ORTHOPEDICS | Facility: CLINIC | Age: 53
End: 2023-08-11
Payer: MEDICAID

## 2023-08-11 NOTE — TELEPHONE ENCOUNTER
----- Message from Pearl Mathis sent at 8/11/2023  9:05 AM CDT -----  Type:  Needs Medical Advice    Who Called:  Elham from [facility]    Would the patient rather a call back or a response via [facility]?  call  Best Call Back Number:  [phone]  Additional Information:  Elham would like a call back from Rn regarding pt bandages are coming out of the absorption pad and bleeding.   Pt is also having an allergic reaction to medication.  Pt had a procedure done on 8/7/23.

## 2023-08-16 RX ORDER — OXYCODONE AND ACETAMINOPHEN 5; 325 MG/1; MG/1
1 TABLET ORAL EVERY 6 HOURS PRN
Qty: 45 EACH | Refills: 0 | OUTPATIENT
Start: 2023-08-16

## 2023-08-22 ENCOUNTER — TELEPHONE (OUTPATIENT)
Dept: ORTHOPEDICS | Facility: CLINIC | Age: 53
End: 2023-08-22

## 2023-08-22 ENCOUNTER — OFFICE VISIT (OUTPATIENT)
Dept: ORTHOPEDICS | Facility: CLINIC | Age: 53
End: 2023-08-22
Payer: MEDICAID

## 2023-08-22 VITALS — HEIGHT: 67 IN | BODY MASS INDEX: 57.98 KG/M2

## 2023-08-22 DIAGNOSIS — M17.0 PRIMARY OSTEOARTHRITIS OF BOTH KNEES: Primary | ICD-10-CM

## 2023-08-22 DIAGNOSIS — Z96.651 S/P TOTAL KNEE ARTHROPLASTY, RIGHT: ICD-10-CM

## 2023-08-22 PROCEDURE — 1160F RVW MEDS BY RX/DR IN RCRD: CPT | Mod: CPTII,,, | Performed by: ORTHOPAEDIC SURGERY

## 2023-08-22 PROCEDURE — 1159F PR MEDICATION LIST DOCUMENTED IN MEDICAL RECORD: ICD-10-PCS | Mod: CPTII,,, | Performed by: ORTHOPAEDIC SURGERY

## 2023-08-22 PROCEDURE — 1160F PR REVIEW ALL MEDS BY PRESCRIBER/CLIN PHARMACIST DOCUMENTED: ICD-10-PCS | Mod: CPTII,,, | Performed by: ORTHOPAEDIC SURGERY

## 2023-08-22 PROCEDURE — 3044F HG A1C LEVEL LT 7.0%: CPT | Mod: CPTII,,, | Performed by: ORTHOPAEDIC SURGERY

## 2023-08-22 PROCEDURE — 99999 PR PBB SHADOW E&M-EST. PATIENT-LVL III: CPT | Mod: PBBFAC,,, | Performed by: ORTHOPAEDIC SURGERY

## 2023-08-22 PROCEDURE — 3044F PR MOST RECENT HEMOGLOBIN A1C LEVEL <7.0%: ICD-10-PCS | Mod: CPTII,,, | Performed by: ORTHOPAEDIC SURGERY

## 2023-08-22 PROCEDURE — 99999 PR PBB SHADOW E&M-EST. PATIENT-LVL III: ICD-10-PCS | Mod: PBBFAC,,, | Performed by: ORTHOPAEDIC SURGERY

## 2023-08-22 PROCEDURE — 3008F BODY MASS INDEX DOCD: CPT | Mod: CPTII,,, | Performed by: ORTHOPAEDIC SURGERY

## 2023-08-22 PROCEDURE — 99213 OFFICE O/P EST LOW 20 MIN: CPT | Mod: PBBFAC,PN | Performed by: ORTHOPAEDIC SURGERY

## 2023-08-22 PROCEDURE — 3008F PR BODY MASS INDEX (BMI) DOCUMENTED: ICD-10-PCS | Mod: CPTII,,, | Performed by: ORTHOPAEDIC SURGERY

## 2023-08-22 PROCEDURE — 99024 PR POST-OP FOLLOW-UP VISIT: ICD-10-PCS | Mod: ,,, | Performed by: ORTHOPAEDIC SURGERY

## 2023-08-22 PROCEDURE — 99024 POSTOP FOLLOW-UP VISIT: CPT | Mod: ,,, | Performed by: ORTHOPAEDIC SURGERY

## 2023-08-22 PROCEDURE — 1159F MED LIST DOCD IN RCRD: CPT | Mod: CPTII,,, | Performed by: ORTHOPAEDIC SURGERY

## 2023-08-22 NOTE — TELEPHONE ENCOUNTER
----- Message from Theodore Hendrickson sent at 8/22/2023  2:44 PM CDT -----  Contact: Case Management  .Type:  Needs Medical Advice    Who Called:  Home Health Case Management  Jazmin    Would the patient rather a call back or a response via [facility]?  Call back  Best Call Back Number: [phone]  Additional Information:  Home health case management  is calling regarding the physical therapy orders to be extended but pt. Was discharge on yesterday.

## 2023-08-22 NOTE — PROGRESS NOTES
Subjective:      Patient ID: Lisa Salmon is a 52 y.o. female.    Chief Complaint: Post-op Evaluation (2 wk p/o- rt TKA )      HPI:  Two weeks postop  The patient is seen for postop follow-up of right  TKA.  Pain control has been satisfactory  They feel that they are ambulating easily  Postoperative complaints include:  None at this time      Current Outpatient Medications:     amlodipine (NORVASC) 5 MG tablet, Take 10 mg by mouth once daily. , Disp: , Rfl:     ARIPiprazole (ABILIFY) 15 MG Tab, Take 15 mg by mouth once daily., Disp: , Rfl:     aspirin (ECOTRIN) 81 MG EC tablet, Take 1 tablet (81 mg total) by mouth once daily., Disp: , Rfl: 0    buPROPion (WELLBUTRIN XL) 300 MG 24 hr tablet, Take 300 mg by mouth once daily., Disp: , Rfl:     citalopram (CELEXA) 10 MG tablet, Take 60 mg by mouth once daily. Takes a 40 and 20 mg, Disp: , Rfl:     clonazePAM (KLONOPIN) 1 MG tablet, Take 1 mg by mouth 3 (three) times daily as needed., Disp: , Rfl:     estradioL (ESTRACE) 2 MG tablet, Take 2 mg by mouth once daily., Disp: , Rfl:     folic acid (FOLVITE) 1 MG tablet, Take 1,000 mcg by mouth once daily., Disp: , Rfl:     gabapentin (NEURONTIN) 600 MG tablet, Take 600 mg by mouth 3 (three) times daily., Disp: , Rfl:     levothyroxine (SYNTHROID) 50 MCG tablet, Take 50 mcg by mouth once daily., Disp: , Rfl:     NON FORMULARY MEDICATION, Apply 1 patch topically once daily. B12  D3/calcium Patch Multivitamin Patch  3 different patches, Disp: , Rfl:     pantoprazole (PROTONIX) 40 MG tablet, Take 40 mg by mouth once daily., Disp: , Rfl:     VITAMIN D2 1,250 mcg (50,000 unit) capsule, Take 50,000 Units by mouth every 7 days., Disp: , Rfl:     ondansetron (ZOFRAN-ODT) 4 MG TbDL, Take 1 tablet (4 mg total) by mouth every 6 (six) hours as needed (nausea). (Patient not taking: Reported on 8/22/2023), Disp: 10 tablet, Rfl: 0    oxyCODONE-acetaminophen (PERCOCET) 5-325 mg per tablet, Take 1 tablet by mouth every 8 (eight) hours as  "needed for Pain. (Patient not taking: Reported on 8/22/2023), Disp: 45 tablet, Rfl: 0  Review of patient's allergies indicates:   Allergen Reactions    Penicillins Other (See Comments)     Causes throat swelling    Nsaids (non-steroidal anti-inflammatory drug)      Can't not take due to bariatric surgery        Ht 5' 7" (1.702 m)   BMI 57.98 kg/m²     ROS        Objective:    Ortho Exam          Alert, oriented, no acute distress  Sclera-Normal  Respiratory distress-none  Gait not observed  Incision:  Cleanly healed  Range of motion:  0-110  Valgus/varus stability- stable  Swelling-minimal  Distal perfusion-intact  Distal neurologic-intact    Imaging:  Right knee radiographs show well-positioned well-fixed total knee implant without complicating process    Assessment:             1. Primary osteoarthritis of both knees    2. S/P total knee arthroplasty, right        The patient is making excellent rehab progress, despite her functional limitations.  There is no evidence of surgical site complication.        Plan:          No follow-ups on file.    I explained my assessment and reviewed the natural history of recovery from the procedure.  Appropriate progression of physical therapy and activity was discussed.    Patient does not have access to ground transportation.  Request placed for 2 more weeks of home PT.          "

## 2023-08-22 NOTE — TELEPHONE ENCOUNTER
Spoke with Ms. Wu from [facility] in regards to Mrs. Gonzalez. Jazmin stated, that Lisa was discharged on Monday, and she will new orders for PT, as she couldn't extend them. I informed Jazmin that, patient was seen in clinic today, and new orders for PT had been placed by Dr. Rogel. All questions answered and patient verbalized understanding.

## 2023-08-23 PROCEDURE — G0180 MD CERTIFICATION HHA PATIENT: HCPCS | Mod: ,,, | Performed by: ORTHOPAEDIC SURGERY

## 2023-08-23 PROCEDURE — G0180 PR HOME HEALTH MD CERTIFICATION: ICD-10-PCS | Mod: ,,, | Performed by: ORTHOPAEDIC SURGERY

## 2023-08-24 ENCOUNTER — PATIENT MESSAGE (OUTPATIENT)
Dept: ORTHOPEDICS | Facility: CLINIC | Age: 53
End: 2023-08-24
Payer: MEDICAID

## 2023-08-30 ENCOUNTER — PATIENT MESSAGE (OUTPATIENT)
Dept: ORTHOPEDICS | Facility: CLINIC | Age: 53
End: 2023-08-30
Payer: MEDICAID

## 2023-09-05 DIAGNOSIS — Z96.651 S/P TOTAL KNEE ARTHROPLASTY, RIGHT: Primary | ICD-10-CM

## 2023-09-05 RX ORDER — OXYCODONE AND ACETAMINOPHEN 5; 325 MG/1; MG/1
1 TABLET ORAL EVERY 8 HOURS PRN
Qty: 21 TABLET | Refills: 0 | Status: SHIPPED | OUTPATIENT
Start: 2023-09-05 | End: 2023-09-18 | Stop reason: SDUPTHER

## 2023-09-06 ENCOUNTER — TELEPHONE (OUTPATIENT)
Dept: ORTHOPEDICS | Facility: CLINIC | Age: 53
End: 2023-09-06
Payer: MEDICAID

## 2023-09-06 NOTE — TELEPHONE ENCOUNTER
----- Message from Shannan Rodriguez sent at 9/6/2023 10:00 AM CDT -----  Type:  Home Health Referral    Who Called:Jazmin/[facility]  Does the patient know what this is regarding?:Home Health Referral  Would the patient rather a call back or a response via [facility]? call  Best Call Back Number:[phone]  Additional Information: Physician assistant Marci put in a Home Health referral, but the pt is already under there care and is due to be discharged this week.  Please call to discuss an extension of services.

## 2023-09-06 NOTE — TELEPHONE ENCOUNTER
Spoke with Jazmin saavedra/ [facility]it[facility] NUNEZ, confirm order should be to extend current orders for PT.  She v/u and will extend therapy for another two weeks.

## 2023-09-11 ENCOUNTER — EXTERNAL HOME HEALTH (OUTPATIENT)
Dept: HOME HEALTH SERVICES | Facility: HOSPITAL | Age: 53
End: 2023-09-11
Payer: MEDICAID

## 2023-09-18 ENCOUNTER — PATIENT MESSAGE (OUTPATIENT)
Dept: ORTHOPEDICS | Facility: CLINIC | Age: 53
End: 2023-09-18

## 2023-09-18 ENCOUNTER — OFFICE VISIT (OUTPATIENT)
Dept: ORTHOPEDICS | Facility: CLINIC | Age: 53
End: 2023-09-18
Payer: MEDICAID

## 2023-09-18 VITALS — BODY MASS INDEX: 45.99 KG/M2 | HEIGHT: 67 IN | WEIGHT: 293 LBS

## 2023-09-18 DIAGNOSIS — Z96.651 S/P TOTAL KNEE ARTHROPLASTY, RIGHT: Primary | ICD-10-CM

## 2023-09-18 DIAGNOSIS — M17.12 PRIMARY OSTEOARTHRITIS OF LEFT KNEE: Primary | ICD-10-CM

## 2023-09-18 DIAGNOSIS — Z96.651 S/P TOTAL KNEE ARTHROPLASTY, RIGHT: ICD-10-CM

## 2023-09-18 PROCEDURE — 3044F PR MOST RECENT HEMOGLOBIN A1C LEVEL <7.0%: ICD-10-PCS | Mod: CPTII,,, | Performed by: PHYSICIAN ASSISTANT

## 2023-09-18 PROCEDURE — 99999PBSHW PR PBB SHADOW TECHNICAL ONLY FILED TO HB: Mod: PBBFAC,,,

## 2023-09-18 PROCEDURE — 1160F RVW MEDS BY RX/DR IN RCRD: CPT | Mod: CPTII,,, | Performed by: PHYSICIAN ASSISTANT

## 2023-09-18 PROCEDURE — 20610 DRAIN/INJ JOINT/BURSA W/O US: CPT | Mod: PBBFAC,PN,LT | Performed by: PHYSICIAN ASSISTANT

## 2023-09-18 PROCEDURE — 1160F PR REVIEW ALL MEDS BY PRESCRIBER/CLIN PHARMACIST DOCUMENTED: ICD-10-PCS | Mod: CPTII,,, | Performed by: PHYSICIAN ASSISTANT

## 2023-09-18 PROCEDURE — 99999 PR PBB SHADOW E&M-EST. PATIENT-LVL III: ICD-10-PCS | Mod: PBBFAC,,, | Performed by: PHYSICIAN ASSISTANT

## 2023-09-18 PROCEDURE — 3008F BODY MASS INDEX DOCD: CPT | Mod: CPTII,,, | Performed by: PHYSICIAN ASSISTANT

## 2023-09-18 PROCEDURE — 99024 POSTOP FOLLOW-UP VISIT: CPT | Mod: POP,,, | Performed by: PHYSICIAN ASSISTANT

## 2023-09-18 PROCEDURE — 20610 DRAIN/INJ JOINT/BURSA W/O US: CPT | Mod: S$PBB,LT,, | Performed by: PHYSICIAN ASSISTANT

## 2023-09-18 PROCEDURE — 99214 PR OFFICE/OUTPT VISIT, EST, LEVL IV, 30-39 MIN: ICD-10-PCS | Mod: S$PBB,24,25, | Performed by: PHYSICIAN ASSISTANT

## 2023-09-18 PROCEDURE — 3044F HG A1C LEVEL LT 7.0%: CPT | Mod: CPTII,,, | Performed by: PHYSICIAN ASSISTANT

## 2023-09-18 PROCEDURE — 1159F MED LIST DOCD IN RCRD: CPT | Mod: CPTII,,, | Performed by: PHYSICIAN ASSISTANT

## 2023-09-18 PROCEDURE — 99999 PR PBB SHADOW E&M-EST. PATIENT-LVL III: CPT | Mod: PBBFAC,,, | Performed by: PHYSICIAN ASSISTANT

## 2023-09-18 PROCEDURE — 99213 OFFICE O/P EST LOW 20 MIN: CPT | Mod: PBBFAC,PN | Performed by: PHYSICIAN ASSISTANT

## 2023-09-18 PROCEDURE — 20610 PR DRAIN/INJECT LARGE JOINT/BURSA: ICD-10-PCS | Mod: S$PBB,LT,, | Performed by: PHYSICIAN ASSISTANT

## 2023-09-18 PROCEDURE — 99999PBSHW PR PBB SHADOW TECHNICAL ONLY FILED TO HB: ICD-10-PCS | Mod: PBBFAC,,,

## 2023-09-18 PROCEDURE — 99024 PR POST-OP FOLLOW-UP VISIT: ICD-10-PCS | Mod: POP,,, | Performed by: PHYSICIAN ASSISTANT

## 2023-09-18 PROCEDURE — 99214 OFFICE O/P EST MOD 30 MIN: CPT | Mod: S$PBB,24,25, | Performed by: PHYSICIAN ASSISTANT

## 2023-09-18 PROCEDURE — 1159F PR MEDICATION LIST DOCUMENTED IN MEDICAL RECORD: ICD-10-PCS | Mod: CPTII,,, | Performed by: PHYSICIAN ASSISTANT

## 2023-09-18 PROCEDURE — 3008F PR BODY MASS INDEX (BMI) DOCUMENTED: ICD-10-PCS | Mod: CPTII,,, | Performed by: PHYSICIAN ASSISTANT

## 2023-09-18 RX ORDER — OXYCODONE AND ACETAMINOPHEN 5; 325 MG/1; MG/1
1 TABLET ORAL EVERY 12 HOURS PRN
Qty: 10 TABLET | Refills: 0 | Status: SHIPPED | OUTPATIENT
Start: 2023-09-18 | End: 2023-10-24

## 2023-09-18 RX ORDER — TRIAMCINOLONE ACETONIDE 40 MG/ML
40 INJECTION, SUSPENSION INTRA-ARTICULAR; INTRAMUSCULAR ONCE
Status: COMPLETED | OUTPATIENT
Start: 2023-09-18 | End: 2023-09-18

## 2023-09-18 RX ADMIN — TRIAMCINOLONE ACETONIDE 40 MG: 40 INJECTION, SUSPENSION INTRA-ARTICULAR; INTRAMUSCULAR at 12:09

## 2023-09-18 NOTE — PROGRESS NOTES
Pt presents for post-op evaluation. She is 6 week s/p right TKA with Dr. Rogel. Pt has no complaints at this time.  She reports occasional sharp pain to knee (mostly at night) and some mild numbness around incision area. Pt is taking pain meds as needed, requesting refill today. Denies fever, chills, discharge from wound site, and N/V. She has been completing PT, says that it is going well. She is still using walker with ambulation, says that PT is trying to transition her to cane.  She is complaining of increased left knee pain since starting PT, known left knee OA, asking for CSI.       Right knee:  Incision healing well  No erythema, warmth, swelling, drainage, or any other signs of infection  Mild numbness around incision, neurovascular status otherwise intact    Left knee:  Tenderness to medial joint line  No swelling, erythema, warmth  Full extension, limited flexion        A/P:  S/p right TKA  Osteoarthritis left knee     1. Continue PT as scheduled.  2. Ambulation with cane as needed.  3. Continue pain medication as prescribed as needed. Refilled today, pt aware that this is last refill.    4. Injection Procedure  A time out was performed, including verification of patient ID, procedure, site and side, availability of information and equipment, review of safety issues, and agreement with consent, the procedure site was marked.    After time out was performed, the patient was prepped aseptically with chloraprep swabsticks. A diagnostic and therapeutic injection of 1:3cc Kenalog/Marcaine was given under sterile technique using a 22g x 1.5 needle from the Anterolateral aspect of the left Knee Joint in the sitting position.      Lisa Salmon had no adverse reactions to the medication. Pain decreased. She was instructed to apply ice to the joint for 20 minutes and avoid strenuous activities for 24-36 hours following the injection. She was warned of possible blood sugar and/or blood pressure changes during that  time. Following that time, she can resume regular activities.    She was reminded to call the clinic immediately for any adverse side effects as explained in clinic today.    5. Return to clinic for follow up with Dr. Rogel, sooner if needed.      Patient voices understanding of and agreement with treatment plan. All of the patient's questions were answered, and the patient will contact us if she has any questions or concerns in the interim.

## 2023-09-21 ENCOUNTER — PATIENT MESSAGE (OUTPATIENT)
Dept: ORTHOPEDICS | Facility: CLINIC | Age: 53
End: 2023-09-21
Payer: MEDICAID

## 2023-09-21 ENCOUNTER — DOCUMENT SCAN (OUTPATIENT)
Dept: HOME HEALTH SERVICES | Facility: HOSPITAL | Age: 53
End: 2023-09-21
Payer: MEDICAID

## 2023-09-22 ENCOUNTER — TELEPHONE (OUTPATIENT)
Dept: ORTHOPEDICS | Facility: CLINIC | Age: 53
End: 2023-09-22
Payer: MEDICAID

## 2023-09-22 DIAGNOSIS — Z96.651 S/P TOTAL KNEE ARTHROPLASTY, RIGHT: Primary | ICD-10-CM

## 2023-09-22 NOTE — TELEPHONE ENCOUNTER
Spoke with Kindra w/ [facility] d /c for next week. Per  nurse, patient declined therapy today as patient was working from home until noon and was unavailable the afternoon. Kindra is requesting patient d/c next week and OT orders placed.     In addition,  nurse recommends OT, as pt is limited at home.

## 2023-09-22 NOTE — TELEPHONE ENCOUNTER
----- Message from Kalyan Oneil sent at 9/22/2023  2:31 PM CDT -----  Contact: [phone]  Type: Requesting to speak with nurse        Who Called:Kindra  (nurse)   Regarding: verbal order to move her therapy discharge visit to next week . Patient refused to see her today   Would the patient rather a call back or a response via [facility]? Call back  Best Call Back Number: [phone]  Additional Information:

## 2023-09-25 ENCOUNTER — PATIENT MESSAGE (OUTPATIENT)
Dept: ORTHOPEDICS | Facility: CLINIC | Age: 53
End: 2023-09-25
Payer: MEDICAID

## 2023-09-26 ENCOUNTER — PATIENT MESSAGE (OUTPATIENT)
Dept: ORTHOPEDICS | Facility: CLINIC | Age: 53
End: 2023-09-26
Payer: MEDICAID

## 2023-09-26 ENCOUNTER — EXTERNAL HOME HEALTH (OUTPATIENT)
Dept: HOME HEALTH SERVICES | Facility: HOSPITAL | Age: 53
End: 2023-09-26
Payer: MEDICAID

## 2023-09-26 ENCOUNTER — TELEPHONE (OUTPATIENT)
Dept: ORTHOPEDICS | Facility: CLINIC | Age: 53
End: 2023-09-26
Payer: MEDICAID

## 2023-09-26 ENCOUNTER — DOCUMENTATION ONLY (OUTPATIENT)
Dept: ORTHOPEDICS | Facility: CLINIC | Age: 53
End: 2023-09-26
Payer: MEDICAID

## 2023-09-26 NOTE — PROGRESS NOTES
Received message from PT that pt was contacted to schedule therapy appointment but she refused appointment and stated that she did not have transportation.

## 2023-09-26 NOTE — TELEPHONE ENCOUNTER
Received message from PT that pt was contacted to schedule therapy appointment but she refused appointment and stated that she did not have transportation.    Is there any way that we can help her to arrange transportation to therapy? Is there social work so someone who can help us with this?

## 2023-10-09 ENCOUNTER — DOCUMENT SCAN (OUTPATIENT)
Dept: HOME HEALTH SERVICES | Facility: HOSPITAL | Age: 53
End: 2023-10-09
Payer: MEDICAID

## 2023-10-24 ENCOUNTER — OFFICE VISIT (OUTPATIENT)
Dept: ORTHOPEDICS | Facility: CLINIC | Age: 53
End: 2023-10-24
Payer: MEDICAID

## 2023-10-24 DIAGNOSIS — M17.0 PRIMARY OSTEOARTHRITIS OF BOTH KNEES: ICD-10-CM

## 2023-10-24 DIAGNOSIS — Z96.651 S/P TOTAL KNEE ARTHROPLASTY, RIGHT: Primary | ICD-10-CM

## 2023-10-24 PROCEDURE — 99999 PR PBB SHADOW E&M-EST. PATIENT-LVL III: ICD-10-PCS | Mod: PBBFAC,,, | Performed by: ORTHOPAEDIC SURGERY

## 2023-10-24 PROCEDURE — 1159F PR MEDICATION LIST DOCUMENTED IN MEDICAL RECORD: ICD-10-PCS | Mod: CPTII,,, | Performed by: ORTHOPAEDIC SURGERY

## 2023-10-24 PROCEDURE — 1159F MED LIST DOCD IN RCRD: CPT | Mod: CPTII,,, | Performed by: ORTHOPAEDIC SURGERY

## 2023-10-24 PROCEDURE — 99024 POSTOP FOLLOW-UP VISIT: CPT | Mod: S$PBB,,, | Performed by: ORTHOPAEDIC SURGERY

## 2023-10-24 PROCEDURE — 99999 PR PBB SHADOW E&M-EST. PATIENT-LVL III: CPT | Mod: PBBFAC,,, | Performed by: ORTHOPAEDIC SURGERY

## 2023-10-24 PROCEDURE — 99024 PR POST-OP FOLLOW-UP VISIT: ICD-10-PCS | Mod: S$PBB,,, | Performed by: ORTHOPAEDIC SURGERY

## 2023-10-24 PROCEDURE — 1160F PR REVIEW ALL MEDS BY PRESCRIBER/CLIN PHARMACIST DOCUMENTED: ICD-10-PCS | Mod: CPTII,,, | Performed by: ORTHOPAEDIC SURGERY

## 2023-10-24 PROCEDURE — 3044F HG A1C LEVEL LT 7.0%: CPT | Mod: CPTII,,, | Performed by: ORTHOPAEDIC SURGERY

## 2023-10-24 PROCEDURE — 1160F RVW MEDS BY RX/DR IN RCRD: CPT | Mod: CPTII,,, | Performed by: ORTHOPAEDIC SURGERY

## 2023-10-24 PROCEDURE — 3044F PR MOST RECENT HEMOGLOBIN A1C LEVEL <7.0%: ICD-10-PCS | Mod: CPTII,,, | Performed by: ORTHOPAEDIC SURGERY

## 2023-10-24 PROCEDURE — 99213 OFFICE O/P EST LOW 20 MIN: CPT | Mod: PBBFAC,PN | Performed by: ORTHOPAEDIC SURGERY

## 2023-10-24 NOTE — LETTER
10/24/2023    To whom it may concern:    Lisa Salmon is under my medical care. She was seen today.    The patient may return to work December 6, 2023.  Restrictions upon return: full duty.    Yours truly,        Jose Rogel MD

## 2023-10-24 NOTE — PROGRESS NOTES
Subjective:      Patient ID: Lisa Salmon is a 52 y.o. female.    Chief Complaint: Post-op Evaluation (3 mth  p/o- rt TKA )      HPI:  About 2.5 months postop  The patient is seen for postop follow-up of right  TKA.  Pain control has been satisfactory  They feel that they are ambulating easily  Postoperative complaints include:  Some lack of confidence with prolonged walking      Current Outpatient Medications:     amlodipine (NORVASC) 5 MG tablet, Take 10 mg by mouth once daily. , Disp: , Rfl:     ARIPiprazole (ABILIFY) 15 MG Tab, Take 15 mg by mouth once daily., Disp: , Rfl:     buPROPion (WELLBUTRIN XL) 300 MG 24 hr tablet, Take 300 mg by mouth once daily., Disp: , Rfl:     citalopram (CELEXA) 10 MG tablet, Take 60 mg by mouth once daily. Takes a 40 and 20 mg, Disp: , Rfl:     clonazePAM (KLONOPIN) 1 MG tablet, Take 1 mg by mouth 3 (three) times daily as needed., Disp: , Rfl:     estradioL (ESTRACE) 2 MG tablet, Take 2 mg by mouth once daily., Disp: , Rfl:     folic acid (FOLVITE) 1 MG tablet, Take 1,000 mcg by mouth once daily., Disp: , Rfl:     gabapentin (NEURONTIN) 600 MG tablet, Take 600 mg by mouth 3 (three) times daily., Disp: , Rfl:     levothyroxine (SYNTHROID) 50 MCG tablet, Take 50 mcg by mouth once daily., Disp: , Rfl:     NON FORMULARY MEDICATION, Apply 1 patch topically once daily. B12  D3/calcium Patch Multivitamin Patch  3 different patches, Disp: , Rfl:     oxyCODONE-acetaminophen (PERCOCET) 5-325 mg per tablet, Take 1 tablet by mouth every 12 (twelve) hours as needed for Pain (as needed for severe pain only)., Disp: 10 tablet, Rfl: 0    pantoprazole (PROTONIX) 40 MG tablet, Take 40 mg by mouth once daily., Disp: , Rfl:     VITAMIN D2 1,250 mcg (50,000 unit) capsule, Take 50,000 Units by mouth every 7 days., Disp: , Rfl:     aspirin (ECOTRIN) 81 MG EC tablet, Take 1 tablet (81 mg total) by mouth once daily., Disp: , Rfl: 0    ondansetron (ZOFRAN-ODT) 4 MG TbDL, Take 1 tablet (4 mg total) by mouth  every 6 (six) hours as needed (nausea). (Patient not taking: Reported on 10/24/2023), Disp: 10 tablet, Rfl: 0  Review of patient's allergies indicates:   Allergen Reactions    Penicillins Other (See Comments)     Causes throat swelling    Nsaids (non-steroidal anti-inflammatory drug)      Can't not take due to bariatric surgery     Oxycodone-acetaminophen     Penicillin g sodium        There were no vitals taken for this visit.    Review of Systems   Constitutional: Negative for fever and weight loss.   HENT:  Negative for congestion.    Eyes:  Negative for visual disturbance.   Cardiovascular:  Negative for chest pain.   Respiratory:  Negative for shortness of breath.    Hematologic/Lymphatic: Negative for bleeding problem. Does not bruise/bleed easily.   Skin:  Negative for poor wound healing.   Gastrointestinal:  Negative for abdominal pain.   Genitourinary:  Negative for dysuria.   Neurological:  Negative for seizures.   Psychiatric/Behavioral:  Negative for altered mental status.    Allergic/Immunologic: Negative for persistent infections.           Objective:    Ortho Exam          Alert, oriented, no acute distress  Sclera-Normal  Respiratory distress-none  Gait no definite limp  Incision:  Cleanly healed  Range of motion:  0-140  Valgus/varus stability- stable  Swelling-none  Distal perfusion-intact  Distal neurologic-intact    Imaging:  None today    Assessment:             1. S/P total knee arthroplasty, right    2. Primary osteoarthritis of both knees        The patient has rehabilitated very well from the procedure.  There is no evidence of any surgical site complication.        Plan:          No follow-ups on file.    I explained the natural history of recovery from this procedure.    Short arc quad program demonstrated to address any residual tendency for giving way.

## 2023-11-14 ENCOUNTER — ON-DEMAND VIRTUAL (OUTPATIENT)
Dept: URGENT CARE | Facility: CLINIC | Age: 53
End: 2023-11-14
Payer: MEDICAID

## 2023-11-14 DIAGNOSIS — M54.9 UPPER BACK PAIN ON RIGHT SIDE: Primary | ICD-10-CM

## 2023-11-14 PROCEDURE — 99203 OFFICE O/P NEW LOW 30 MIN: CPT | Mod: 95,,, | Performed by: NURSE PRACTITIONER

## 2023-11-14 PROCEDURE — 99203 PR OFFICE/OUTPT VISIT, NEW, LEVL III, 30-44 MIN: ICD-10-PCS | Mod: 95,,, | Performed by: NURSE PRACTITIONER

## 2023-11-14 RX ORDER — METHYLPREDNISOLONE 4 MG/1
TABLET ORAL
Qty: 21 EACH | Refills: 0 | Status: SHIPPED | OUTPATIENT
Start: 2023-11-14

## 2023-11-14 NOTE — PROGRESS NOTES
Subjective:      Patient ID: Lsia Salmon is a 52 y.o. female.    Vitals:  vitals were not taken for this visit.     Chief Complaint: Back Pain      Visit Type: TELE AUDIOVISUAL    Present with the patient at the time of consultation: TELEMED PRESENT WITH PATIENT: None    Past Medical History:   Diagnosis Date    Anxiety disorder, unspecified     Arthritis     Depression     High cholesterol     Hypertension     Hypothyroidism, unspecified     Neuropathy     Panic attack     occasional    Sleep apnea     does not use prescribed machine     Past Surgical History:   Procedure Laterality Date    BARIATRIC SURGERY      gastric sleeve    BILATERAL TUBAL LIGATION      CERVICAL SPINE SURGERY      prosthetic disc in neck (C6-7)     SECTION  1990    x1    CHOLECYSTECTOMY      HYSTERECTOMY      KNEE ARTHROPLASTY Right 2023    Procedure: ARTHROPLASTY, KNEE;  Surgeon: Jose Rogel MD;  Location: [facility];  Service: Orthopedics;  Laterality: Right;     Review of patient's allergies indicates:   Allergen Reactions    Penicillins Other (See Comments)     Causes throat swelling    Nsaids (non-steroidal anti-inflammatory drug)      Can't not take due to bariatric surgery     Oxycodone-acetaminophen     Penicillin g sodium      Current Outpatient Medications on File Prior to Visit   Medication Sig Dispense Refill    amlodipine (NORVASC) 5 MG tablet Take 10 mg by mouth once daily.       ARIPiprazole (ABILIFY) 15 MG Tab Take 15 mg by mouth once daily.      aspirin (ECOTRIN) 81 MG EC tablet Take 1 tablet (81 mg total) by mouth once daily.  0    buPROPion (WELLBUTRIN XL) 300 MG 24 hr tablet Take 300 mg by mouth once daily.      citalopram (CELEXA) 10 MG tablet Take 60 mg by mouth once daily. Takes a 40 and 20 mg      clonazePAM (KLONOPIN) 1 MG tablet Take 1 mg by mouth 3 (three) times daily as needed.      estradioL (ESTRACE) 2 MG tablet Take 2 mg by mouth once daily.      folic acid (FOLVITE) 1 MG tablet Take  1,000 mcg by mouth once daily.      gabapentin (NEURONTIN) 600 MG tablet Take 600 mg by mouth 3 (three) times daily.      levothyroxine (SYNTHROID) 50 MCG tablet Take 50 mcg by mouth once daily.      NON FORMULARY MEDICATION Apply 1 patch topically once daily. B12   D3/calcium Patch  Multivitamin Patch    3 different patches      ondansetron (ZOFRAN-ODT) 4 MG TbDL Take 1 tablet (4 mg total) by mouth every 6 (six) hours as needed (nausea). (Patient not taking: Reported on 10/24/2023) 10 tablet 0    pantoprazole (PROTONIX) 40 MG tablet Take 40 mg by mouth once daily.      VITAMIN D2 1,250 mcg (50,000 unit) capsule Take 50,000 Units by mouth every 7 days.       No current facility-administered medications on file prior to visit.     History reviewed. No pertinent family history.    Medications Ordered                [facility] [address] [place] [address]    Telephone: [phone]   Fax: [phone]   Hours: Not open 24 hours                         E-Prescribed (1 of 1)              methylPREDNISolone (MEDROL DOSEPACK) 4 mg tablet    Sig: use as directed       Start: 11/14/23     Quantity: 21 each Refills: 0                           [facility] Peq Odvv Intake    11/14/2023  8:46 AM CST - Filed by Patient   Describe your reason for todays visit Pulped musle in back   What is your current physical address in the event of a medical emergency? [address] [place] [address]   Are you able to take your vital signs? No   Please attach any relevant images or files          Upper right back pain. Pulled a muscle. No trauma or injury. No relief with Tylenol. Pain with movement. No numbness or tingling. No bowel or bladder concerns.    Back Pain  Associated symptoms include bladder incontinence. Pertinent negatives include no bowel incontinence, fever or numbness.       Constitution: Negative for chills and fever.   Gastrointestinal:  Negative  for bowel incontinence.   Genitourinary:  Positive for bladder incontinence.   Musculoskeletal:  Positive for back pain, muscle cramps and muscle ache. Negative for joint pain, joint swelling and abnormal ROM of joint.   Neurological:  Negative for numbness and tingling.        Objective:   The physical exam was conducted virtually.  Physical Exam   Constitutional: She is oriented to person, place, and time. She does not appear ill. No distress.   HENT:   Head: Normocephalic and atraumatic.   Nose: Nose normal.   Eyes: Extraocular movement intact   Pulmonary/Chest: Effort normal.   Abdominal: Normal appearance.   Musculoskeletal: Normal range of motion.         General: Normal range of motion.   Neurological: no focal deficit. She is alert and oriented to person, place, and time.   Psychiatric: Her behavior is normal. Mood normal.   Vitals reviewed.      Assessment:     1. Upper back pain on right side        Plan:   Patient encouraged to monitor symptoms closely and instructed to follow-up for new or worsening symptoms. Further, in-person, evaluation may be necessary for continued treatment. Please follow up with your primary care doctor or specialist as needed. Verbally discussed plan. Patient confirms understanding and is in agreement with treatment and plan.     You must understand that you've received a Virtual Care evaluation only and that you may be released before all your medical problems are known or treated. You, the patient, will arrange for follow up care as instructed.      Upper back pain on right side  -     methylPREDNISolone (MEDROL DOSEPACK) 4 mg tablet; use as directed  Dispense: 21 each; Refill: 0        Patient Instructions   Recommendations:     .Rest, Ice/Heat, Compression(brace or ace wrap), and Elevation may be beneficial.     .Tylenol over the counter as directed.     . Monitor for worsening symptoms: increased pain, swelling, redness/bruising, numbness, tingling or decreased mobility.

## 2023-11-14 NOTE — LETTER
November 14, 2023    Lisa Salmon  [address] [place] [address]             Virtual Visit - Urgent Care  Urgent Care  [address] [facility] [id number]   November 14, 2023     Patient: Lisa Salmon   YOB: 1970   Date of Visit: 11/14/2023       To Whom it May Concern:    Lisa Salmon was seen virtually on 11/14/2023. She may return to work on or after 11/15/23, provided symptoms have improved .    Please excuse her from any classes or work missed.    If you have any questions or concerns, please don't hesitate to call.    Sincerely,         Graciela Rodriguez, NP

## 2023-11-14 NOTE — PATIENT INSTRUCTIONS
Recommendations:     .Rest, Ice/Heat, Compression(brace or ace wrap), and Elevation may be beneficial.     .Tylenol over the counter as directed.     . Monitor for worsening symptoms: increased pain, swelling, redness/bruising, numbness, tingling or decreased mobility.

## 2023-12-16 ENCOUNTER — OFFICE VISIT (OUTPATIENT)
Dept: URGENT CARE | Facility: CLINIC | Age: 53
End: 2023-12-16
Payer: MEDICAID

## 2023-12-16 VITALS
DIASTOLIC BLOOD PRESSURE: 76 MMHG | HEART RATE: 76 BPM | OXYGEN SATURATION: 97 % | SYSTOLIC BLOOD PRESSURE: 124 MMHG | TEMPERATURE: 99 F | BODY MASS INDEX: 45.99 KG/M2 | RESPIRATION RATE: 16 BRPM | WEIGHT: 293 LBS | HEIGHT: 67 IN

## 2023-12-16 DIAGNOSIS — R11.0 NAUSEA: ICD-10-CM

## 2023-12-16 DIAGNOSIS — J06.9 UPPER RESPIRATORY TRACT INFECTION, UNSPECIFIED TYPE: Primary | ICD-10-CM

## 2023-12-16 DIAGNOSIS — R05.9 COUGH, UNSPECIFIED TYPE: ICD-10-CM

## 2023-12-16 LAB
CTP QC/QA: YES
CTP QC/QA: YES
MOLECULAR STREP A: NEGATIVE
POC MOLECULAR INFLUENZA A AGN: NEGATIVE
POC MOLECULAR INFLUENZA B AGN: NEGATIVE

## 2023-12-16 PROCEDURE — 99214 PR OFFICE/OUTPT VISIT, EST, LEVL IV, 30-39 MIN: ICD-10-PCS | Mod: S$GLB,,, | Performed by: NURSE PRACTITIONER

## 2023-12-16 PROCEDURE — 87502 POCT INFLUENZA A/B MOLECULAR: ICD-10-PCS | Mod: QW,S$GLB,, | Performed by: NURSE PRACTITIONER

## 2023-12-16 PROCEDURE — 87651 STREP A DNA AMP PROBE: CPT | Mod: QW,S$GLB,, | Performed by: NURSE PRACTITIONER

## 2023-12-16 PROCEDURE — 87651 POCT STREP A MOLECULAR: ICD-10-PCS | Mod: QW,S$GLB,, | Performed by: NURSE PRACTITIONER

## 2023-12-16 PROCEDURE — 99214 OFFICE O/P EST MOD 30 MIN: CPT | Mod: S$GLB,,, | Performed by: NURSE PRACTITIONER

## 2023-12-16 PROCEDURE — 87502 INFLUENZA DNA AMP PROBE: CPT | Mod: QW,S$GLB,, | Performed by: NURSE PRACTITIONER

## 2023-12-16 RX ORDER — BENZONATATE 200 MG/1
200 CAPSULE ORAL 3 TIMES DAILY PRN
Qty: 30 CAPSULE | Refills: 0 | Status: SHIPPED | OUTPATIENT
Start: 2023-12-16 | End: 2023-12-26

## 2023-12-16 RX ORDER — ONDANSETRON 4 MG/1
4 TABLET, ORALLY DISINTEGRATING ORAL EVERY 6 HOURS PRN
Qty: 10 TABLET | Refills: 0 | Status: SHIPPED | OUTPATIENT
Start: 2023-12-16

## 2023-12-16 NOTE — PROGRESS NOTES
"Subjective:      Patient ID: Lisa Salmon is a 53 y.o. female.    Vitals:  height is 5' 7" (1.702 m) and weight is 167.8 kg (370 lb) (abnormal). Her oral temperature is 98.9 °F (37.2 °C). Her blood pressure is 124/76 and her pulse is 76. Her respiration is 16 and oxygen saturation is 97%.     Chief Complaint: Sore Throat    This is a 53 y.o. female who presents today with a chief complaint of sore throat, cough, congestion, nausea. Patient symptoms started two days ago and she is taking otc cold medications but no relief. denies fever, body aches or chills, denies  wheezing or shortness of breath, denies  diarrhea or abdominal pain, denies chest pain or dizziness positional lightheadedness, denies sore throat or trouble swallowing, denies loss of taste or smell, or any other symptoms        Sore Throat   This is a new problem. The current episode started in the past 7 days. The problem has been unchanged. There has been no fever. The pain is at a severity of 8/10. The pain is moderate. Associated symptoms include congestion, coughing, headaches and vomiting (due to cough). Pertinent negatives include no diarrhea, ear discharge, hoarse voice, shortness of breath, stridor, swollen glands or trouble swallowing. She has had no exposure to strep or mono.       HENT:  Positive for congestion and sore throat. Negative for ear discharge and trouble swallowing.    Respiratory:  Positive for cough. Negative for shortness of breath and stridor.    Gastrointestinal:  Positive for nausea and vomiting (due to cough). Negative for diarrhea.   Neurological:  Positive for headaches.      Objective:     Physical Exam   Constitutional: She is oriented to person, place, and time. She appears well-developed. She is cooperative.  Non-toxic appearance. She does not appear ill. No distress.   HENT:   Head: Normocephalic and atraumatic.   Ears:   Right Ear: Hearing, tympanic membrane, external ear and ear canal normal.   Left Ear: Hearing, " tympanic membrane, external ear and ear canal normal.   Nose: Nose normal. No mucosal edema, rhinorrhea or nasal deformity. No epistaxis. Right sinus exhibits no maxillary sinus tenderness and no frontal sinus tenderness. Left sinus exhibits no maxillary sinus tenderness and no frontal sinus tenderness.   Mouth/Throat: Uvula is midline, oropharynx is clear and moist and mucous membranes are normal. No trismus in the jaw. Normal dentition. No uvula swelling. No oropharyngeal exudate, posterior oropharyngeal edema, posterior oropharyngeal erythema, tonsillar abscesses or cobblestoning.   Eyes: Conjunctivae and lids are normal. No scleral icterus.   Neck: Trachea normal and phonation normal. Neck supple. No edema present. No erythema present. No neck rigidity present.   Cardiovascular: Normal rate, regular rhythm, normal heart sounds and normal pulses.   Pulmonary/Chest: Effort normal and breath sounds normal. No stridor. No respiratory distress. She has no decreased breath sounds. She has no wheezes. She has no rhonchi. She has no rales.   Abdominal: Normal appearance.   Musculoskeletal: Normal range of motion.         General: No deformity. Normal range of motion.   Lymphadenopathy:     She has no cervical adenopathy.   Neurological: She is alert and oriented to person, place, and time. She exhibits normal muscle tone. Coordination normal.   Skin: Skin is warm, dry, intact, not diaphoretic and not pale.   Psychiatric: Her speech is normal and behavior is normal. Judgment and thought content normal.   Nursing note and vitals reviewed.    Results for orders placed or performed in visit on 12/16/23   POCT Influenza A/B MOLECULAR   Result Value Ref Range    POC Molecular Influenza A Ag Negative Negative, Not Reported    POC Molecular Influenza B Ag Negative Negative, Not Reported     Acceptable Yes    POCT Strep A, Molecular   Result Value Ref Range    Molecular Strep A, POC Negative Negative    Quality  Control Acceptable Yes          Patient in no acute distress.  Vitals reassuring.  Discussed results/diagnosis/plan in depth with patient in clinic. Strict precautions given to patient to monitor for worsening signs and symptoms. Advised to follow up with primary.All questions answered. Strict ER precautions given. If your symptoms worsens or fail to improve you should go to the Emergency Room. Discharge and follow-up instructions given verbally/printed. Discharge and follow-up instructions discussed with the patient who expressed understanding and willingness to comply with my recommendations.Patient voiced understanding and in agreement with current treatment plan.     Please be advised this text was dictated with [employer] software and may contain errors due to translation.      Assessment:     1. Upper respiratory tract infection, unspecified type    2. Nausea    3. Cough, unspecified type        Plan:       Upper respiratory tract infection, unspecified type  -     POCT Influenza A/B MOLECULAR  -     POCT Strep A, Molecular    Nausea    Cough, unspecified type    Other orders  -     ondansetron (ZOFRAN-ODT) 4 MG TbDL; Take 1 tablet (4 mg total) by mouth every 6 (six) hours as needed (nausea).  Dispense: 10 tablet; Refill: 0  -     benzonatate (TESSALON) 200 MG capsule; Take 1 capsule (200 mg total) by mouth 3 (three) times daily as needed for Cough.  Dispense: 30 capsule; Refill: 0                  Patient Instructions   PLEASE READ YOUR DISCHARGE INSTRUCTIONS ENTIRELY AS IT CONTAINS IMPORTANT INFORMATION.      Please drink plenty of fluids.    Please get plenty of rest.    Please return here or go to the Emergency Department for any concerns or worsening of condition.    Please take an over the counter antihistamine medication (allegra/Claritin/Zyrtec) of your choice as directed.    Try an over the counter decongestant like Mucinex D or Sudafed. You buy this behind the pharmacy counter    If you do have  Hypertension or palpitations, it is safe to take Coricidin HBP for relief of sinus symptoms.    If not allergic, please take over the counter Tylenol (Acetaminophen) and/or Motrin (Ibuprofen) as directed for control of pain and/or fever.  Please follow up with your primary care doctor or specialist as needed.    Sore throat recommendations: Warm fluids, warm salt water gargles, throat lozenges, tea, honey, soup, rest, hydration.    Use over the counter flonase: one spray each nostril twice daily OR two sprays each nostril once daily.     If you  smoke, please stop smoking.      Please return or see your primary care doctor if you develop new or worsening symptoms.     Please arrange follow up with your primary medical clinic as soon as possible. You must understand that you've received an Urgent Care treatment only and that you may be released before all of your medical problems are known or treated. You, the patient, will arrange for follow up as instructed. If your symptoms worsen or fail to improve you should go to the Emergency Room.

## 2024-07-03 ENCOUNTER — TELEPHONE (OUTPATIENT)
Dept: ORTHOPEDICS | Facility: CLINIC | Age: 54
End: 2024-07-03
Payer: MEDICAID

## 2024-07-03 NOTE — TELEPHONE ENCOUNTER
----- Message from Deidra Nazario sent at 7/3/2024  2:49 PM CDT -----  Type:  Needs Medical Advice    Who Called: pt   Symptoms (please be specific): pt needs to get a left knee injection    How long has patient had these symptoms:  left knee pain   Would the patient rather a call back or a response via [facility]? Call back   Best Call Back Number:  [phone]  Additional Information: pt will be losing her insurance at the End of July so she would like to get a injection scheduled before the end of the month

## 2024-07-05 DIAGNOSIS — M17.12 PRIMARY OSTEOARTHRITIS OF LEFT KNEE: Primary | ICD-10-CM

## 2024-07-05 NOTE — PROGRESS NOTES
Subjective:      Patient ID: Lisa Salmon is a 53 y.o. female.    Chief Complaint: Pain of the Left Knee    HPI  53 y.o. female who presents for follow-up of left knee OA  Last saw Mirna Dumont PA-C for left knee pain management on 9/2023  Given CSI to left knee, which has provided relief for 4 months  Today, patient reports she has had recurrence of left knee pain over the past 6 months  Denies severe pain or symptoms with standing and walking  Denies numbness/tingling  Reports injection(s) is/are wearing off  Interested in repeat CSI today     Previous hx:  Pt has multiyear hx of left knee pain secondary to OA  Tx has included rest, CSI  Pain worst to medial joint line  Pain worst with standing and walking.   Improves with rest   No numbness/tingling    Blood thinners:  None  DM:  No    Of note, patient recently had right TKA on 07/18/2023.  She reports no complaints in regards to this dx at this time    Review of Systems   Constitutional: Negative for fever and weight loss.   HENT:  Negative for congestion.    Eyes:  Negative for visual disturbance.   Cardiovascular:  Negative for chest pain.   Respiratory:  Negative for shortness of breath.    Hematologic/Lymphatic: Negative for bleeding problem. Does not bruise/bleed easily.   Skin:  Negative for poor wound healing.   Gastrointestinal:  Negative for abdominal pain.   Genitourinary:  Negative for dysuria.   Neurological:  Negative for seizures.   Psychiatric/Behavioral:  Negative for altered mental status.    Allergic/Immunologic: Negative for persistent infections.         Objective:      Ortho/SPM Exam      Left knee     The patient is not in acute distress.   Sclerae normal  Body habitus is obese.  Respiratory distress:  none   The patient walks with a limp.  Hip irritability  negative.   The skin over the knee is intact.  Knee effusion 1+  Palpation- tender joint line  Range of motion- 0-115  Ligament laxity exam:  2+ valgus laxity   Patellar apprehension  negative.  Popliteal cyst negative  Patellar crepitation present.  Meniscal irritability not applicable  Pulses DP present, PT present.  Motor normal 5/5 strength in all tested muscle groups.   Sensory normal.       I independently interpreted the patient's imaging. Xrays of the patient's left knee demonstrate significant joint space narrowing that is worst to the medial compartment, osteosclerosis, and osteophyte formation  no evidence of any acute fractures or dislocations             Assessment:       Encounter Diagnoses   Name Primary?    Primary osteoarthritis of left knee Yes    Chronic pain of left knee           The arthritic condition is structurally advanced.  Progression is likely.          Plan:       Lisa was seen today for pain.    Diagnoses and all orders for this visit:    Primary osteoarthritis of left knee  -     Large Joint Aspiration/Injection: L knee    Chronic pain of left knee  -     Large Joint Aspiration/Injection: L knee    Plan: The patient and I had a thorough discussion today.  We discussed the working diagnosis as well as several other potential alternative diagnoses.    We discussed conservative and surgical treatment options. Conservative options include rest, activity modifications, workplace modifications, po and topical analgesia (OTC and rx), formal or home PT, and others. Surgical treatment was also mentioned.    At this time, the patient would like to proceed with the following, which I agree with.    Medications:  OTC analgesia as needed for pain  Procedures:  CSI given today for left knee   Work status:  WB[employer] with limitation secondary to pain  Follow up: in 3 months with Dr Rogel or me for left knee follow-up we will need repeat left knee x-ray at next follow-up.  Patient has expressed interest in gel injection.  We will be able to perform gel injection at this time.  Instructed patient to call for virtual appointment prior to in office appointment if she would like to have  gel injections ordered beforehand    All of the patient's questions were answered and the patient will contact us if they have any questions or concerns in the interim.      Nette Green PA-C  [facility]  Orthopedic Surgery

## 2024-07-09 ENCOUNTER — OFFICE VISIT (OUTPATIENT)
Dept: ORTHOPEDICS | Facility: CLINIC | Age: 54
End: 2024-07-09
Payer: MEDICAID

## 2024-07-09 VITALS — WEIGHT: 293 LBS | HEIGHT: 67 IN | BODY MASS INDEX: 45.99 KG/M2

## 2024-07-09 DIAGNOSIS — G89.29 CHRONIC PAIN OF LEFT KNEE: ICD-10-CM

## 2024-07-09 DIAGNOSIS — M17.12 PRIMARY OSTEOARTHRITIS OF LEFT KNEE: Primary | ICD-10-CM

## 2024-07-09 DIAGNOSIS — M25.562 CHRONIC PAIN OF LEFT KNEE: ICD-10-CM

## 2024-07-09 PROCEDURE — 99999 PR PBB SHADOW E&M-EST. PATIENT-LVL III: CPT | Mod: PBBFAC,,,

## 2024-07-09 PROCEDURE — 99999PBSHW PR PBB SHADOW TECHNICAL ONLY FILED TO HB: Mod: PBBFAC,,,

## 2024-07-09 PROCEDURE — 99213 OFFICE O/P EST LOW 20 MIN: CPT | Mod: PBBFAC,PN

## 2024-07-09 PROCEDURE — 20610 DRAIN/INJ JOINT/BURSA W/O US: CPT | Mod: PBBFAC,PN

## 2024-07-09 RX ORDER — OMEPRAZOLE 40 MG/1
40 CAPSULE, DELAYED RELEASE ORAL DAILY
COMMUNITY

## 2024-07-09 RX ORDER — ROSUVASTATIN CALCIUM 5 MG/1
5 TABLET, COATED ORAL DAILY
COMMUNITY

## 2024-07-09 RX ORDER — TRIAMCINOLONE ACETONIDE 40 MG/ML
40 INJECTION, SUSPENSION INTRA-ARTICULAR; INTRAMUSCULAR
Status: DISCONTINUED | OUTPATIENT
Start: 2024-07-09 | End: 2024-07-09 | Stop reason: HOSPADM

## 2024-07-09 RX ADMIN — TRIAMCINOLONE ACETONIDE 40 MG: 40 INJECTION, SUSPENSION INTRA-ARTICULAR; INTRAMUSCULAR at 03:07

## 2024-07-09 NOTE — PROCEDURES
Large Joint Aspiration/Injection: L knee    Date/Time: 7/9/2024 3:30 PM    Performed by: Nette Green PA-C  Authorized by: Nette Green PA-C    Consent Done?:  Yes (Verbal)  Indications:  Pain, arthritis and joint swelling  Site marked: the procedure site was marked    Timeout: prior to procedure the correct patient, procedure, and site was verified    Prep: patient was prepped and draped in usual sterile fashion      Details:  Needle Size:  22 G  Ultrasonic Guidance for needle placement?: No    Location:  Knee  Site:  L knee  Medications:  40 mg triamcinolone acetonide 40 mg/mL  Patient tolerance:  Patient tolerated the procedure well with no immediate complications

## 2024-08-29 ENCOUNTER — PATIENT MESSAGE (OUTPATIENT)
Dept: ORTHOPEDICS | Facility: CLINIC | Age: 54
End: 2024-08-29
Payer: COMMERCIAL

## 2024-09-03 ENCOUNTER — OFFICE VISIT (OUTPATIENT)
Dept: ORTHOPEDICS | Facility: CLINIC | Age: 54
End: 2024-09-03
Payer: COMMERCIAL

## 2024-09-03 DIAGNOSIS — M17.12 PRIMARY OSTEOARTHRITIS OF LEFT KNEE: Primary | ICD-10-CM

## 2024-09-03 PROCEDURE — 1159F MED LIST DOCD IN RCRD: CPT | Mod: CPTII,95,, | Performed by: ORTHOPAEDIC SURGERY

## 2024-09-03 PROCEDURE — 99213 OFFICE O/P EST LOW 20 MIN: CPT | Mod: 95,,, | Performed by: ORTHOPAEDIC SURGERY

## 2024-09-03 PROCEDURE — 1160F RVW MEDS BY RX/DR IN RCRD: CPT | Mod: CPTII,95,, | Performed by: ORTHOPAEDIC SURGERY

## 2024-09-03 NOTE — PROGRESS NOTES
Established Patient - Audio Only Telehealth Visit     The patient location is:  Home  The chief complaint leading to consultation is:  Left knee arthritis  Visit type: Virtual visit with audio only (telephone)  Total time spent with patient:  5 minutes       The reason for the audio only service rather than synchronous audio and video virtual visit was related to technical difficulties or patient preference/necessity.     Each patient to whom I provide medical services by telemedicine is:  (1) informed of the relationship between the physician and patient and the respective role of any other health care provider with respect to management of the patient; and (2) notified that they may decline to receive medical services by telemedicine and may withdraw from such care at any time. Patient verbally consented to receive this service via voice-only telephone call.       HPI:  The patient is followed for advanced arthritis of her left knee.  She reports that her last CSI was not particularly helpful.  She can not take NSAIDs because of gastric bypass.  The patient would like to try viscosupplementation, with the understanding that she will likely need to consider knee arthroplasty at some point in the future     Assessment and plan:  Given the patient's condition, her treatment plan is reasonable.  I explained the nature of viscosupplementation along with the expectations usual clinical course and process for administration.  She agreed to this.    The patient should see me 3 months after viscosupplementation with new x-rays                       This service was not originating from a related E/M service provided within the previous 7 days nor will  to an E/M service or procedure within the next 24 hours or my soonest available appointment.  Prevailing standard of care was able to be met in this audio-only visit.

## 2024-10-23 ENCOUNTER — ON-DEMAND VIRTUAL (OUTPATIENT)
Dept: URGENT CARE | Facility: CLINIC | Age: 54
End: 2024-10-23
Payer: COMMERCIAL

## 2024-10-23 DIAGNOSIS — R11.10 VOMITING AND DIARRHEA: Primary | ICD-10-CM

## 2024-10-23 DIAGNOSIS — R19.7 VOMITING AND DIARRHEA: Primary | ICD-10-CM

## 2024-10-23 NOTE — LETTER
October 23, 2024    Lisa Salmon  [address] [place] [address]             Virtual Visit - Urgent Care  Urgent Care  [address] [facility] [id number]   October 23, 2024     Patient: Lisa Salmon   YOB: 1970   Date of Visit: 10/23/2024       To Whom it May Concern:    Lisa Salmon was seen virtually on 10/23/2024. She may return to work on 10/24/24 .    Please excuse her from any classes or work missed.    If you have any questions or concerns, please don't hesitate to call.    Sincerely,         Kelly Kaufman MD

## 2024-10-23 NOTE — PROGRESS NOTES
Subjective:      Patient ID: Lisa Salmon is a 53 y.o. female.    Vitals:  vitals were not taken for this visit.     Chief Complaint: Emesis and Diarrhea      Visit Type: TELE AUDIOVISUAL    Present with the patient at the time of consultation: TELEMED PRESENT WITH PATIENT: None at home     Past Medical History:   Diagnosis Date    Anxiety disorder, unspecified     Arthritis     Depression     High cholesterol     Hypertension     Hypothyroidism, unspecified     Neuropathy     Panic attack     occasional    Sleep apnea     does not use prescribed machine     Past Surgical History:   Procedure Laterality Date    BARIATRIC SURGERY      gastric sleeve    BILATERAL TUBAL LIGATION      CERVICAL SPINE SURGERY      prosthetic disc in neck (C6-7)     SECTION  1990    x1    CHOLECYSTECTOMY      HYSTERECTOMY      KNEE ARTHROPLASTY Right 2023    Procedure: ARTHROPLASTY, KNEE;  Surgeon: Jose Rogel MD;  Location: [facility];  Service: Orthopedics;  Laterality: Right;     Review of patient's allergies indicates:   Allergen Reactions    Penicillins Other (See Comments)     Causes throat swelling    Nsaids (non-steroidal anti-inflammatory drug)      Can't not take due to bariatric surgery     Oxycodone-acetaminophen     Penicillin g sodium      Current Outpatient Medications on File Prior to Visit   Medication Sig Dispense Refill    amlodipine (NORVASC) 5 MG tablet Take 10 mg by mouth once daily.       ARIPiprazole (ABILIFY) 15 MG Tab Take 15 mg by mouth once daily.      aspirin (ECOTRIN) 81 MG EC tablet Take 1 tablet (81 mg total) by mouth once daily.  0    buPROPion (WELLBUTRIN XL) 300 MG 24 hr tablet Take 300 mg by mouth once daily.      citalopram (CELEXA) 10 MG tablet Take 60 mg by mouth once daily. Takes a 40 and 20 mg      clonazePAM (KLONOPIN) 1 MG tablet Take 1 mg by mouth 3 (three) times daily as needed.      estradioL (ESTRACE) 2 MG tablet Take 2 mg by mouth once daily.      folic acid (FOLVITE)  1 MG tablet Take 1,000 mcg by mouth once daily.      gabapentin (NEURONTIN) 600 MG tablet Take 600 mg by mouth 3 (three) times daily.      levothyroxine (SYNTHROID) 50 MCG tablet Take 75 mcg by mouth once daily.      methylPREDNISolone (MEDROL DOSEPACK) 4 mg tablet use as directed (Patient not taking: Reported on 7/9/2024) 21 each 0    NON FORMULARY MEDICATION Apply 1 patch topically once daily. B12   D3/calcium Patch  Multivitamin Patch    3 different patches      omeprazole (PRILOSEC) 40 MG capsule Take 40 mg by mouth once daily.      ondansetron (ZOFRAN-ODT) 4 MG TbDL Take 1 tablet (4 mg total) by mouth every 6 (six) hours as needed (nausea). 10 tablet 0    rosuvastatin (CRESTOR) 5 MG tablet Take 5 mg by mouth once daily.      VITAMIN D2 1,250 mcg (50,000 unit) capsule Take 50,000 Units by mouth every 7 days.       No current facility-administered medications on file prior to visit.     No family history on file.        Ohs Peq Odvv Intake    10/23/2024  4:44 PM CDT - Filed by Patient   What is your current physical address in the event of a medical emergency? [address] [place] [address]   Are you able to take your vital signs? No   Please attach any relevant images or files    Is your employer contracted with [employer] [employer]? No         Patient has had 2 days of vomiting, and diarrhea. Vomiting has resolved but patient still having diarrhea. Patient states she is staying hydrated and taking fluids.    Emesis   This is a new problem. The current episode started in the past 7 days. The problem has been gradually improving. There has been no fever. Associated symptoms include diarrhea.   Diarrhea   This is a new problem. The current episode started today. Associated symptoms include vomiting.       Gastrointestinal:  Positive for vomiting and diarrhea.        Objective:   The physical exam was conducted virtually.  Physical Exam   Constitutional: She is oriented to person, place, and time. normal  HENT:    Head: Normocephalic and atraumatic.   Ears:   Right Ear: External ear normal.   Left Ear: External ear normal.   Eyes: Conjunctivae are normal.   Pulmonary/Chest: Effort normal.   Abdominal: Normal appearance.   Neurological: no focal deficit. She is alert and oriented to person, place, and time.   Psychiatric: Her behavior is normal. Mood, judgment and thought content normal.       Assessment:     1. Vomiting and diarrhea        Plan:       Vomiting and diarrhea      Follow up with in person visit if symptoms do not improve   Dr Kaufman

## 2025-01-16 ENCOUNTER — ON-DEMAND VIRTUAL (OUTPATIENT)
Dept: URGENT CARE | Facility: CLINIC | Age: 55
End: 2025-01-16
Payer: COMMERCIAL

## 2025-01-16 DIAGNOSIS — K52.9 GASTROENTERITIS: Primary | ICD-10-CM

## 2025-01-16 RX ORDER — ONDANSETRON 4 MG/1
4 TABLET, ORALLY DISINTEGRATING ORAL EVERY 8 HOURS PRN
Qty: 9 TABLET | Refills: 0 | Status: SHIPPED | OUTPATIENT
Start: 2025-01-16 | End: 2025-01-19

## 2025-01-16 NOTE — PATIENT INSTRUCTIONS
Stay Hydrated: Use electrolyte-rich fluids such as Gatorade, Pedialyte, coconut water, or rehydration packets to help replenish lost fluids and electrolytes. These fluids are more effective at rehydration compared to plain water or vitamin water.  Increase Clear Liquids: Consume clear liquids such as water, Gatorade, Pedialyte, broths, and jello to maintain hydration. It's advisable to hold off on solid foods for 12-18 hours and then gradually advance to the BRAT diet (banana, rice, applesauce, tea, toast/crackers), followed by further food advancement as tolerated.  Use of Pepto-Bismol: Pepto-Bismol can help alleviate symptoms of diarrhea. However, it's important to use it as directed and be aware of any potential side effects. Avoid using Imodium (loperamide) for diarrhea relief.    If symptoms worsening or not improved f/u in the Local Urgent Care or ER

## 2025-01-16 NOTE — PROGRESS NOTES
Subjective:      Patient ID: Lisa Salmon is a 54 y.o. female.    Vitals:  vitals were not taken for this visit.     Chief Complaint: Emesis and Diarrhea      Visit Type: TELE AUDIOVISUAL    Present with the patient at the time of consultation: TELEMED PRESENT WITH PATIENT: Fauzia Mcgee     Past Medical History:   Diagnosis Date    Anxiety disorder, unspecified     Arthritis     Depression     High cholesterol     Hypertension     Hypothyroidism, unspecified     Neuropathy     Panic attack     occasional    Sleep apnea     does not use prescribed machine     Past Surgical History:   Procedure Laterality Date    BARIATRIC SURGERY      gastric sleeve    BILATERAL TUBAL LIGATION      CERVICAL SPINE SURGERY      prosthetic disc in neck (C6-7)     SECTION  1990    x1    CHOLECYSTECTOMY      HYSTERECTOMY      KNEE ARTHROPLASTY Right 2023    Procedure: ARTHROPLASTY, KNEE;  Surgeon: Jose Rogel MD;  Location: [facility];  Service: Orthopedics;  Laterality: Right;     Review of patient's allergies indicates:   Allergen Reactions    Penicillins Other (See Comments)     Causes throat swelling    Nsaids (non-steroidal anti-inflammatory drug)      Can't not take due to bariatric surgery     Oxycodone-acetaminophen     Penicillin g sodium      Current Outpatient Medications on File Prior to Visit   Medication Sig Dispense Refill    amlodipine (NORVASC) 5 MG tablet Take 10 mg by mouth once daily.       ARIPiprazole (ABILIFY) 15 MG Tab Take 15 mg by mouth once daily.      aspirin (ECOTRIN) 81 MG EC tablet Take 1 tablet (81 mg total) by mouth once daily.  0    buPROPion (WELLBUTRIN XL) 300 MG 24 hr tablet Take 300 mg by mouth once daily.      citalopram (CELEXA) 10 MG tablet Take 60 mg by mouth once daily. Takes a 40 and 20 mg      clonazePAM (KLONOPIN) 1 MG tablet Take 1 mg by mouth 3 (three) times daily as needed.      estradioL (ESTRACE) 2 MG tablet Take 2 mg by mouth once daily.      folic acid  (FOLVITE) 1 MG tablet Take 1,000 mcg by mouth once daily.      gabapentin (NEURONTIN) 600 MG tablet Take 600 mg by mouth 3 (three) times daily.      levothyroxine (SYNTHROID) 50 MCG tablet Take 75 mcg by mouth once daily.      methylPREDNISolone (MEDROL DOSEPACK) 4 mg tablet use as directed (Patient not taking: Reported on 7/9/2024) 21 each 0    NON FORMULARY MEDICATION Apply 1 patch topically once daily. B12   D3/calcium Patch  Multivitamin Patch    3 different patches      omeprazole (PRILOSEC) 40 MG capsule Take 40 mg by mouth once daily.      ondansetron (ZOFRAN-ODT) 4 MG TbDL Take 1 tablet (4 mg total) by mouth every 6 (six) hours as needed (nausea). 10 tablet 0    rosuvastatin (CRESTOR) 5 MG tablet Take 5 mg by mouth once daily.      VITAMIN D2 1,250 mcg (50,000 unit) capsule Take 50,000 Units by mouth every 7 days.       No current facility-administered medications on file prior to visit.     No family history on file.    Medications Ordered                [facility] - [address] [place] [id number]    Telephone: [phone]   Fax: [phone]   Hours: Not open 24 hours                         E-Prescribed (1 of 1)              ondansetron (ZOFRAN-ODT) 4 MG TbDL    Sig: Take 1 tablet (4 mg total) by mouth every 8 (eight) hours as needed (for nausea or vomiting).       Start: 1/16/25     Quantity: 9 tablet Refills: 0                           Ohs Peq Odvv Intake    1/16/2025  3:30 PM CST - Filed by Patient   What is your current physical address in the event of a medical emergency? [address] [place] [address]   Are you able to take your vital signs? No   Please attach any relevant images or files    Is your employer contracted with [facility]? No         Pt presents with c/o nausea and vomiting x 3 days with associated diarrhea. Reports less diarrhea today, last vomited 2 hours ago. Currently using phenergan,  not helpful, report in the past zofran not helpful, but will try. She denies any fever, CP, SOB.     Pt request work excuse work to return to work in 4 days         HENT:  Negative for sore throat.    Cardiovascular:  Negative for chest pain.   Respiratory:  Negative for shortness of breath.    Gastrointestinal:  Positive for abdominal pain, nausea, vomiting and diarrhea.   Neurological:  Negative for dizziness.        Objective:   The physical exam was conducted virtually.  Physical Exam   Constitutional: She is oriented to person, place, and time.   HENT:   Head: Normocephalic.   Ears:   Right Ear: External ear normal.   Left Ear: External ear normal.   Eyes: Conjunctivae are normal.   Neck: Neck supple.   Pulmonary/Chest: Effort normal. No respiratory distress.   Neurological: She is alert and oriented to person, place, and time.       Assessment:     1. Gastroenteritis        Plan:       Gastroenteritis  -     ondansetron (ZOFRAN-ODT) 4 MG TbDL; Take 1 tablet (4 mg total) by mouth every 8 (eight) hours as needed (for nausea or vomiting).  Dispense: 9 tablet; Refill: 0    We appreciate you trusting us with your medical care. We hope you feel better soon. We will be happy to take care of you for all of your future medical needs.     You must understand that you've received Virtual treatment only and that you may be released before all your medical problems are known or treated. You, the patient, will arrange for follow up care as instructed.     Follow up with your PCP or specialty clinic as directed in the next 2-3 days if not improved or as needed. You can call [phone] to schedule an appointment with the appropriate provider.     If your condition worsens we recommend that you receive another evaluation in person, with your primary care provider, urgent care or at the emergency room immediately or contact your primary medical clinics after hours call service to discuss your concerns.

## 2025-01-16 NOTE — LETTER
January 16, 2025    Lisa Salmon  [address] [place] [address]             Virtual Visit - Urgent Care  Urgent Care  [address] [facility] [id number]   January 16, 2025     Patient: Lisa Salmon   YOB: 1970   Date of Visit: 1/16/2025       To Whom it May Concern:    Lisa Salmon was seen virtually on 1/16/2025. She may return to work on 1/20/2025 .    Please excuse her from any classes or work missed.    If you have any questions or concerns, please don't hesitate to call.    Sincerely,         Mckenna Doss, NP

## 2025-03-31 ENCOUNTER — PATIENT MESSAGE (OUTPATIENT)
Dept: ORTHOPEDICS | Facility: CLINIC | Age: 55
End: 2025-03-31

## 2025-04-09 ENCOUNTER — TELEPHONE (OUTPATIENT)
Dept: ORTHOPEDICS | Facility: CLINIC | Age: 55
End: 2025-04-09
Payer: COMMERCIAL

## 2025-04-09 NOTE — TELEPHONE ENCOUNTER
Called patient with date/ time for upcoming appointment. Pt understood new date/ time. Pt was r/s due to Ms. Gonzalez assisting with sx on pt previous appointment date.

## 2025-04-23 ENCOUNTER — TELEPHONE (OUTPATIENT)
Dept: ORTHOPEDICS | Facility: CLINIC | Age: 55
End: 2025-04-23
Payer: COMMERCIAL

## 2025-04-24 ENCOUNTER — OFFICE VISIT (OUTPATIENT)
Dept: ORTHOPEDICS | Facility: CLINIC | Age: 55
End: 2025-04-24
Payer: COMMERCIAL

## 2025-04-24 ENCOUNTER — PATIENT MESSAGE (OUTPATIENT)
Dept: ORTHOPEDICS | Facility: CLINIC | Age: 55
End: 2025-04-24

## 2025-04-24 DIAGNOSIS — M17.12 PRIMARY OSTEOARTHRITIS OF LEFT KNEE: Primary | ICD-10-CM

## 2025-04-24 DIAGNOSIS — G89.29 CHRONIC PAIN OF LEFT KNEE: ICD-10-CM

## 2025-04-24 DIAGNOSIS — M25.562 CHRONIC PAIN OF LEFT KNEE: ICD-10-CM

## 2025-04-24 PROCEDURE — 99999 PR PBB SHADOW E&M-EST. PATIENT-LVL III: CPT | Mod: PBBFAC,,,

## 2025-04-24 NOTE — PROGRESS NOTES
Subjective:      Patient ID: Lisa Salmon is a 54 y.o. female.    Chief Complaint: No chief complaint on file.      HPI: Lisa Salmon is a 54 y.o. female who presents to clinic for first left knee Euflexxa injection.    Patient has had good results with CSI in the past.      Ambulating: unassisted  Diabetic:  No  Smoking:  She has never smoked.  History of DVT/PE: Negative    PAST MEDICAL HISTORY:    Past Medical History:   Diagnosis Date    Anxiety disorder, unspecified     Arthritis     Depression     High cholesterol     Hypertension     Hypothyroidism, unspecified     Neuropathy     Panic attack     occasional    Sleep apnea     does not use prescribed machine     MEDICATIONS: Current Medications[1]  ALLERGIES:   Review of patient's allergies indicates:   Allergen Reactions    Penicillins Other (See Comments)     Causes throat swelling    Nsaids (non-steroidal anti-inflammatory drug)      Can't not take due to bariatric surgery     Oxycodone-acetaminophen     Penicillin g sodium        Review of Systems:  Constitution: Negative for chills, fever and night sweats.   HENT: Negative for congestion and headaches.    Eyes: Negative for blurred vision or vision loss.  Cardiovascular: Negative for chest pain and syncope.   Respiratory: Negative for cough and shortness of breath.    Endocrine: Negative for polydipsia, polyphagia and polyuria.   Hematologic/Lymphatic: Negative for bleeding problem. Does not bruise/bleed easily.   Skin: Negative for dry skin, itching and rash.   Musculoskeletal: See HPI.   Gastrointestinal: Negative for abdominal pain and bowel incontinence.   Genitourinary: Negative for bladder incontinence and nocturia.   Neurological: Negative for disturbances in coordination, loss of balance and seizures.   Psychiatric/Behavioral: Negative for depression. The patient does not have insomnia.    Allergic/Immunologic: Negative for hives and persistent infections.          Objective:      There were no  vitals filed for this visit.    PHYSICAL EXAM:  General: Alert & oriented x3, well-developed and well-nourished, in no acute distress, sitting comfortably in the exam room.  Skin: Warm and dry. Capillary refill less than 2 seconds.   Head: Normocephalic and atraumatic.   Eyes: Sclera appear normal.   Nose: No deformities seen.   Ears: No deformities seen.   Neck: No tracheal deviation present.   Pulmonary/Chest: Breathing unlabored.   Neurological: Alert and oriented to person, place, and time.   Psychiatric: Mood is pleasant and affect appropriate.     LEFT KNEE        Body habitus is obese.         The patient walks with a limp.        The skin over the knee is intact. No signs of infection.         Knee effusion:  1+        Pulses DP present, PT present.        Motor:  normal 5/5 strength in all tested muscle groups.         Sensory:  normal.      Imaging:   X-Rays: 4 views of bilateral knee dated 05/08/2023 , reviewed, show: There is significant medial compartment joint space loss bilaterally. There is no fracture, dislocation, or bony erosion.         Assessment:       1. Primary osteoarthritis of left knee    2. Chronic pain of left knee        Plan:       Orders Placed This Encounter    Large Joint Aspiration/Injection: L knee     I explained the nature of the problem to the patient.     I discussed at length with the patient all the different treatment options available for her left knee including anti-inflammatories, acetaminophen, rest, ice/heat, physical therapy to include strengthening exercise, weight loss, corticosteroid injections, viscosupplement injections, and Iovera.     I explained the potential role of knee replacement in the treatment of this condition. I also explained the typical clinical course.  The usual complications that that can occur were also discussed. The patient understands that if non-surgical measures do not adequately control symptoms, surgery will be considered in the future.  The patient agrees to discuss this again at follow-up, if clinically warranted.    Medications:  Continue OTC Tylenol and NSAIDs as needed for pain management.   Procedures:  Viscosupplement injection requested and performed today to the left knee with 20mg/2mL of Euflexxa. See procedure note. Standard precautions provided.   Pain Management: Ice compress to the affected area 2-3x a day for 15-20 minutes as needed for pain management.  Other:  Advised to avoid strenuous activities for the next 24-48 hours. After 48 hours, resume activities as tolerated.       Follow-Up: 1 week with Nubia Gonzalez PA-C for 2nd injection.    All of the patient's questions were answered and the patient will contact us if they have any questions or concerns in the interim.    Nubia Gonzalez PA-C  [facility]  Orthopedic Surgery    Medical Dictation software was used during the dictation of portions or the entirety of this medical record.  Phonetic or grammatic errors may exist due to the use of this software. For clarification, refer to the author of the document.             [1]   Current Outpatient Medications:     amlodipine (NORVASC) 5 MG tablet, Take 10 mg by mouth once daily. , Disp: , Rfl:     ARIPiprazole (ABILIFY) 15 MG Tab, Take 15 mg by mouth once daily., Disp: , Rfl:     aspirin (ECOTRIN) 81 MG EC tablet, Take 1 tablet (81 mg total) by mouth once daily., Disp: , Rfl: 0    buPROPion (WELLBUTRIN XL) 300 MG 24 hr tablet, Take 300 mg by mouth once daily., Disp: , Rfl:     citalopram (CELEXA) 10 MG tablet, Take 60 mg by mouth once daily. Takes a 40 and 20 mg, Disp: , Rfl:     clonazePAM (KLONOPIN) 1 MG tablet, Take 1 mg by mouth 3 (three) times daily as needed., Disp: , Rfl:     estradioL (ESTRACE) 2 MG tablet, Take 2 mg by mouth once daily., Disp: , Rfl:     folic acid (FOLVITE) 1 MG tablet, Take 1,000 mcg by mouth once daily., Disp: , Rfl:     gabapentin (NEURONTIN) 600 MG tablet, Take 600 mg by mouth 3 (three) times  daily., Disp: , Rfl:     levothyroxine (SYNTHROID) 50 MCG tablet, Take 75 mcg by mouth once daily., Disp: , Rfl:     methylPREDNISolone (MEDROL DOSEPACK) 4 mg tablet, use as directed (Patient not taking: Reported on 7/9/2024), Disp: 21 each, Rfl: 0    NON FORMULARY MEDICATION, Apply 1 patch topically once daily. B12  D3/calcium Patch Multivitamin Patch  3 different patches, Disp: , Rfl:     omeprazole (PRILOSEC) 40 MG capsule, Take 40 mg by mouth once daily., Disp: , Rfl:     ondansetron (ZOFRAN-ODT) 4 MG TbDL, Take 1 tablet (4 mg total) by mouth every 6 (six) hours as needed (nausea)., Disp: 10 tablet, Rfl: 0    rosuvastatin (CRESTOR) 5 MG tablet, Take 5 mg by mouth once daily., Disp: , Rfl:     VITAMIN D2 1,250 mcg (50,000 unit) capsule, Take 50,000 Units by mouth every 7 days., Disp: , Rfl:

## 2025-04-24 NOTE — PROCEDURES
Large Joint Aspiration/Injection: L knee    Date/Time: 4/24/2025 3:00 PM    Performed by: Bonnie Gonzalez PA-C  Authorized by: Bonnie Gonzalez PA-C    Consent Done?:  Yes (Verbal)  Indications:  Arthritis and pain  Site marked: the procedure site was marked    Timeout: prior to procedure the correct patient, procedure, and site was verified      Local anesthesia used?: Yes    Local anesthetic:  Topical anesthetic    Details:  Needle Size:  22 G  Ultrasonic Guidance for needle placement?: No    Approach:  Anterolateral  Location:  Knee  Site:  L knee  Medications:  20 mg sodium hyaluronate (EUFLEXXA) 10 mg/mL(mw 2.4 -3.6 million)  Patient tolerance:  Patient tolerated the procedure well with no immediate complications    Injection Procedure Note   Prior to procedure the correct patient, procedure, and site was verified. Allergies were reviewed and verbal consent was obtained. The procedure site was marked.    After time out was performed, the patient was prepped aseptically with chloraprep, the area was sprayed with local topical anesthetic, and then cleaned with alcohol. A therapeutic injection of 20mg/2mL of Euflexxa was given under sterile technique using a 22-gauge x 1.5 needle from the anterolateral aspect of the left knee joint. Sterile dressing applied.    Patient tolerated the procedure well. Pain decreased. She had no adverse reactions to the medication. She was reminded to call the clinic immediately for any adverse side effects as explained in clinic today.     Advised patient to avoid strenuous activities for the next 24-48 hours and to apply ice for 20 minutes to help alleviate this this pain.

## 2025-04-28 ENCOUNTER — PATIENT MESSAGE (OUTPATIENT)
Dept: ORTHOPEDICS | Facility: CLINIC | Age: 55
End: 2025-04-28
Payer: COMMERCIAL

## 2025-05-01 NOTE — PROGRESS NOTES
Subjective:      Patient ID: Lisa Salmon is a 54 y.o. female.    Chief Complaint: No chief complaint on file.      HPI: Lisa Salmon is a 54 y.o. female who presents to clinic for second left knee Euflexxa injection.    Patient has had good results with [facility] in the past.      Ambulating: unassisted  Diabetic:  No  Smoking:  She has never smoked.  History of DVT/PE: Negative    PAST MEDICAL HISTORY:    Past Medical History:   Diagnosis Date    Anxiety disorder, unspecified     Arthritis     Depression     High cholesterol     Hypertension     Hypothyroidism, unspecified     Neuropathy     Panic attack     occasional    Sleep apnea     does not use prescribed machine     MEDICATIONS: Current Medications[1]  ALLERGIES:   Review of patient's allergies indicates:   Allergen Reactions    Penicillins Other (See Comments)     Causes throat swelling    Nsaids (non-steroidal anti-inflammatory drug)      Can't not take due to bariatric surgery     Oxycodone-acetaminophen     Penicillin g sodium        Review of Systems:  Constitution: Negative for chills, fever and night sweats.   HENT: Negative for congestion and headaches.    Eyes: Negative for blurred vision or vision loss.  Cardiovascular: Negative for chest pain and syncope.   Respiratory: Negative for cough and shortness of breath.    Endocrine: Negative for polydipsia, polyphagia and polyuria.   Hematologic/Lymphatic: Negative for bleeding problem. Does not bruise/bleed easily.   Skin: Negative for dry skin, itching and rash.   Musculoskeletal: See HPI.   Gastrointestinal: Negative for abdominal pain and bowel incontinence.   Genitourinary: Negative for bladder incontinence and nocturia.   Neurological: Negative for disturbances in coordination, loss of balance and seizures.   Psychiatric/Behavioral: Negative for depression. The patient does not have insomnia.    Allergic/Immunologic: Negative for hives and persistent infections.          Objective:      There were no  vitals filed for this visit.    PHYSICAL EXAM:  General: Alert & oriented x3, well-developed and well-nourished, in no acute distress, sitting comfortably in the exam room.  Skin: Warm and dry. Capillary refill less than 2 seconds.   Head: Normocephalic and atraumatic.   Eyes: Sclera appear normal.   Nose: No deformities seen.   Ears: No deformities seen.   Neck: No tracheal deviation present.   Pulmonary/Chest: Breathing unlabored.   Neurological: Alert and oriented to person, place, and time.   Psychiatric: Mood is pleasant and affect appropriate.     LEFT KNEE        Body habitus is obese.         The patient walks with a limp.        The skin over the knee is intact. No signs of infection.          Knee effusion:  1+        Pulses DP present, PT present.        Motor:  normal 5/5 strength in all tested muscle groups.         Sensory:  normal.      Imaging:   X-Rays: 4 views of bilateral knee dated 05/08/2023, reviewed, show: There is significant medial compartment joint space loss bilaterally. There is no fracture, dislocation, or bony erosion.         Assessment:       No diagnosis found.      Plan:            I explained the nature of the problem to the patient.     I discussed at length with the patient all the different treatment options available for her left knee including anti-inflammatories, acetaminophen, rest, ice/heat, physical therapy to include strengthening exercise, weight loss, corticosteroid injections, viscosupplement injections, and Iovera.     I explained the potential role of knee replacement in the treatment of this condition. I also explained the typical clinical course.  The usual complications that that can occur were also discussed. The patient understands that if non-surgical measures do not adequately control symptoms, surgery will be considered in the future. The patient agrees to discuss this again at follow-up, if clinically warranted.    Medications:  Continue OTC Tylenol and  NSAIDs as needed for pain management.   Procedures:  Viscosupplement injection requested and performed today to the left knee with 20mg/2mL of Euflexxa. See procedure note. Standard precautions provided.   Pain Management: Ice compress to the affected area 2-3x a day for 15-20 minutes as needed for pain management.  Other:  Advised to avoid strenuous activities for the next 24-48 hours. After 48 hours, resume activities as tolerated.       Follow-Up: 1 week with Nubia Gonzalez PA-C for 3rd injection.    All of the patient's questions were answered and the patient will contact us if they have any questions or concerns in the interim.    Nubia Gonzalez PA-C  [facility]  Orthopedic Surgery    Medical Dictation software was used during the dictation of portions or the entirety of this medical record.  Phonetic or grammatic errors may exist due to the use of this software. For clarification, refer to the author of the document.               [1]   Current Outpatient Medications:     amlodipine (NORVASC) 5 MG tablet, Take 10 mg by mouth once daily. , Disp: , Rfl:     ARIPiprazole (ABILIFY) 15 MG Tab, Take 15 mg by mouth once daily., Disp: , Rfl:     aspirin (ECOTRIN) 81 MG EC tablet, Take 1 tablet (81 mg total) by mouth once daily., Disp: , Rfl: 0    buPROPion (WELLBUTRIN XL) 300 MG 24 hr tablet, Take 300 mg by mouth once daily., Disp: , Rfl:     citalopram (CELEXA) 10 MG tablet, Take 60 mg by mouth once daily. Takes a 40 and 20 mg, Disp: , Rfl:     clonazePAM (KLONOPIN) 1 MG tablet, Take 1 mg by mouth 3 (three) times daily as needed., Disp: , Rfl:     estradioL (ESTRACE) 2 MG tablet, Take 2 mg by mouth once daily., Disp: , Rfl:     folic acid (FOLVITE) 1 MG tablet, Take 1,000 mcg by mouth once daily., Disp: , Rfl:     gabapentin (NEURONTIN) 600 MG tablet, Take 600 mg by mouth 3 (three) times daily., Disp: , Rfl:     levothyroxine (SYNTHROID) 50 MCG tablet, Take 75 mcg by mouth once daily., Disp: , Rfl:      methylPREDNISolone (MEDROL DOSEPACK) 4 mg tablet, use as directed (Patient not taking: Reported on 7/9/2024), Disp: 21 each, Rfl: 0    NON FORMULARY MEDICATION, Apply 1 patch topically once daily. B12  D3/calcium Patch Multivitamin Patch  3 different patches, Disp: , Rfl:     omeprazole (PRILOSEC) 40 MG capsule, Take 40 mg by mouth once daily., Disp: , Rfl:     ondansetron (ZOFRAN-ODT) 4 MG TbDL, Take 1 tablet (4 mg total) by mouth every 6 (six) hours as needed (nausea)., Disp: 10 tablet, Rfl: 0    rosuvastatin (CRESTOR) 5 MG tablet, Take 5 mg by mouth once daily., Disp: , Rfl:     VITAMIN D2 1,250 mcg (50,000 unit) capsule, Take 50,000 Units by mouth every 7 days., Disp: , Rfl:

## 2025-05-02 ENCOUNTER — OFFICE VISIT (OUTPATIENT)
Dept: ORTHOPEDICS | Facility: CLINIC | Age: 55
End: 2025-05-02
Payer: COMMERCIAL

## 2025-05-02 DIAGNOSIS — G89.29 CHRONIC PAIN OF LEFT KNEE: ICD-10-CM

## 2025-05-02 DIAGNOSIS — M17.12 PRIMARY OSTEOARTHRITIS OF LEFT KNEE: Primary | ICD-10-CM

## 2025-05-02 DIAGNOSIS — M25.562 CHRONIC PAIN OF LEFT KNEE: ICD-10-CM

## 2025-05-02 PROCEDURE — 99999 PR PBB SHADOW E&M-EST. PATIENT-LVL III: CPT | Mod: PBBFAC,,,

## 2025-05-02 NOTE — PROCEDURES
Large Joint Aspiration/Injection: L knee    Date/Time: 5/2/2025 3:00 PM    Performed by: Bonnie Gonzalez PA-C  Authorized by: Bonnie Gonzalez PA-C    Consent Done?:  Yes (Verbal)  Indications:  Arthritis and pain  Site marked: the procedure site was marked    Timeout: prior to procedure the correct patient, procedure, and site was verified      Local anesthesia used?: Yes    Local anesthetic:  Topical anesthetic    Details:  Needle Size:  22 G  Ultrasonic Guidance for needle placement?: No    Approach:  Anterolateral  Location:  Knee  Site:  L knee  Medications:  20 mg sodium hyaluronate (EUFLEXXA) 10 mg/mL(mw 2.4 -3.6 million)  Patient tolerance:  Patient tolerated the procedure well with no immediate complications      Injection Procedure Note   Prior to procedure the correct patient, procedure, and site was verified. Allergies were reviewed and verbal consent was obtained. The procedure site was marked.    After time out was performed, the patient was prepped aseptically with chloraprep, the area was sprayed with local topical anesthetic, and then cleaned with alcohol. A therapeutic injection of 20mg/2mL of Euflexxa was given under sterile technique using a 22-gauge x 1.5 needle from the anterolateral aspect of the left knee joint. Sterile dressing applied.    Patient tolerated the procedure well. Pain decreased. She had no adverse reactions to the medication. She was reminded to call the clinic immediately for any adverse side effects as explained in clinic today.     Advised patient to avoid strenuous activities for the next 24-48 hours and to apply ice for 20 minutes to help alleviate this this pain.

## 2025-05-09 ENCOUNTER — OFFICE VISIT (OUTPATIENT)
Dept: ORTHOPEDICS | Facility: CLINIC | Age: 55
End: 2025-05-09
Payer: COMMERCIAL

## 2025-05-09 DIAGNOSIS — G89.29 CHRONIC PAIN OF LEFT KNEE: ICD-10-CM

## 2025-05-09 DIAGNOSIS — M25.512 ACUTE PAIN OF LEFT SHOULDER: ICD-10-CM

## 2025-05-09 DIAGNOSIS — M17.12 PRIMARY OSTEOARTHRITIS OF LEFT KNEE: Primary | ICD-10-CM

## 2025-05-09 DIAGNOSIS — M25.562 CHRONIC PAIN OF LEFT KNEE: ICD-10-CM

## 2025-05-09 DIAGNOSIS — R52 PAIN: Primary | ICD-10-CM

## 2025-05-09 DIAGNOSIS — M75.102 ROTATOR CUFF SYNDROME, LEFT: ICD-10-CM

## 2025-05-09 PROCEDURE — 99999 PR PBB SHADOW E&M-EST. PATIENT-LVL III: CPT | Mod: PBBFAC,,,

## 2025-05-09 RX ORDER — TRIAMCINOLONE ACETONIDE 40 MG/ML
40 INJECTION, SUSPENSION INTRA-ARTICULAR; INTRAMUSCULAR
Status: DISCONTINUED | OUTPATIENT
Start: 2025-05-09 | End: 2025-05-09 | Stop reason: HOSPADM

## 2025-05-09 RX ADMIN — TRIAMCINOLONE ACETONIDE 40 MG: 40 INJECTION, SUSPENSION INTRA-ARTICULAR; INTRAMUSCULAR at 03:05

## 2025-05-09 NOTE — PROCEDURES
Large Joint Aspiration/Injection: L subacromial bursa    Date/Time: 5/9/2025 3:00 PM    Performed by: Bonnie Gonzalez PA-C  Authorized by: Bonnie Gonzalez PA-C    Consent Done?:  Yes (Verbal)  Indications:  Pain and diagnostic evaluation  Site marked: the procedure site was marked    Timeout: prior to procedure the correct patient, procedure, and site was verified      Local anesthesia used?: Yes    Local anesthetic:  Topical anesthetic    Details:  Needle Size:  22 G  Ultrasonic Guidance for needle placement?: No    Approach:  Posterior  Location:  Shoulder  Site:  L subacromial bursa  Medications:  40 mg triamcinolone acetonide 40 mg/mL  Patient tolerance:  Patient tolerated the procedure well with no immediate complications    Injection Procedure Note   Prior to procedure the correct patient, procedure, and site was verified. Allergies were reviewed and verbal consent was obtained. The procedure site was marked.    After time out was performed, the patient was prepped aseptically with chloraprep, the area was sprayed with local topical anesthetic, and then cleaned with alcohol. A therapeutic subacromial injection of combination of 2 cc 1% Xylocaine and 40mg Triamcinolone was given under sterile technique using a 22-gauge x 1.5 needle from the posterior aspect of the left shoulder. Sterile dressing applied.     Patient tolerated the procedure well. Pain decreased. She had no adverse reactions to the medication.     The patient is cautioned that immediate relief of pain is secondary to the local anesthetic and will be temporary. After the anesthetic wears off there may be a increase in pain that may last for a few hours or a few days. Advised patient to avoid strenuous activities for the next 24-48 hours and to apply ice for 20 minutes to help alleviate this this pain. She was warned of possible blood sugar and/or blood pressure changes following injection. She was reminded to call the clinic  immediately for any adverse side effects as explained in clinic today.

## 2025-05-09 NOTE — PROGRESS NOTES
Subjective:      Patient ID: Lisa Salmon is a 54 y.o. female.    Chief Complaint: No chief complaint on file.      HPI: Lisa Salmon is a 54 y.o. female who presents to clinic for third left knee Euflexxa injection.    Patient has had good results with [facility] in the past.    Patient also reports left shoulder pain over the last couple of days.  She reports reaching her arm back behind her over the couch and feeling like she strained a muscle.  Pain is located over anterior superior shoulder.  She reports the pain as a 10/10 at its worst.  No reports of numbness, tingling or radiation.      Ambulating: unassisted  Diabetic:  No  Smoking:  She has never smoked.  History of DVT/PE: Negative    PAST MEDICAL HISTORY:    Past Medical History:   Diagnosis Date    Anxiety disorder, unspecified     Arthritis     Depression     High cholesterol     Hypertension     Hypothyroidism, unspecified     Neuropathy     Panic attack     occasional    Sleep apnea     does not use prescribed machine     MEDICATIONS: Current Medications[1]  ALLERGIES:   Review of patient's allergies indicates:   Allergen Reactions    Penicillins Other (See Comments)     Causes throat swelling    Nsaids (non-steroidal anti-inflammatory drug)      Can't not take due to bariatric surgery     Oxycodone-acetaminophen     Penicillin g sodium        Review of Systems:  Constitution: Negative for chills, fever and night sweats.   HENT: Negative for congestion and headaches.    Eyes: Negative for blurred vision or vision loss.  Cardiovascular: Negative for chest pain and syncope.   Respiratory: Negative for cough and shortness of breath.    Endocrine: Negative for polydipsia, polyphagia and polyuria.   Hematologic/Lymphatic: Negative for bleeding problem. Does not bruise/bleed easily.   Skin: Negative for dry skin, itching and rash.   Musculoskeletal: See HPI.   Gastrointestinal: Negative for abdominal pain and bowel incontinence.   Genitourinary: Negative for  bladder incontinence and nocturia.   Neurological: Negative for disturbances in coordination, loss of balance and seizures.   Psychiatric/Behavioral: Negative for depression. The patient does not have insomnia.    Allergic/Immunologic: Negative for hives and persistent infections.          Objective:      There were no vitals filed for this visit.    PHYSICAL EXAM:  General: Alert & oriented x3, well-developed and well-nourished, in no acute distress, sitting comfortably in the exam room.  Skin: Warm and dry. Capillary refill less than 2 seconds.   Head: Normocephalic and atraumatic.   Eyes: Sclera appear normal.   Nose: No deformities seen.   Ears: No deformities seen.   Neck: No tracheal deviation present.   Pulmonary/Chest: Breathing unlabored.   Neurological: Alert and oriented to person, place, and time.   Psychiatric: Mood is pleasant and affect appropriate.     LEFT KNEE        Body habitus is obese.         The patient walks with a limp.        The skin over the knee is intact. No signs of infection.          Knee effusion:  1+        Pulses DP present, PT present.        Motor:  normal 5/5 strength in all tested muscle groups.         Sensory:  normal.    LEFT SHOULDER        Inspection/Observation:   No evidence of swelling, redness, scars, visible deformity, or atrophy.         Palpation:    No tenderness at the SC or AC joint  No tenderness over the clavicle   No tenderness over biceps tendon or bicipital groove  No tenderness over subacromial space        Range of Motion:   Active Forward Flexion:  180°  * with pain   Active Abduction:   180°  * with pain  Active Internal Rotation:  to T7    Active External Rotation:  45°          Special Tests:  Empty can test  Positive  Full can test   Positive  Resisted internal rotation Negative  Resisted external rotation Negative  Neer's test   Positive  Johnson'-Rodrigo test Negative        Strength Testing:  Forward Flexion  4 /5  Abduction   4 /5  Internal  Rotation  5 /5  External Rotation  5 /5        Neurovascular Exam Bilateral UEs:  Sensation intact to light touch in the distal median, radial, and ulnar nerve distributions bilaterally.  Capillary refill intact <2 seconds in all digits bilaterally       Imaging:   X-Rays: 4 views of left knee obtained in the Orthopedic clinic today, independently reviewed, show: There is significant medial compartment joint space loss with osteophyte formation. There is no fracture or dislocation.    X-ray three-view of left shoulder obtained in the orthopaedic clinic today, independently reviewed, show: No acute fractures or dislocations.  Glenohumeral joint spaces are preserved.  Mild degenerative changes of AC joint.  No evidence of foreign bodies.          Assessment:       1. Primary osteoarthritis of left knee    2. Chronic pain of left knee    3. Rotator cuff syndrome, left    4. Acute pain of left shoulder          Plan:       Orders Placed This Encounter    Large Joint Aspiration/Injection: L knee    Large Joint Aspiration/Injection: L subacromial bursa       I explained the nature of the problem to the patient.     I discussed at length with the patient all the different treatment options available for her left knee including anti-inflammatories, acetaminophen, rest, ice/heat, physical therapy to include strengthening exercise, weight loss, corticosteroid injections, viscosupplement injections, and Iovera.     I explained the potential role of knee replacement in the treatment of this condition. I also explained the typical clinical course.  The usual complications that that can occur were also discussed. The patient understands that if non-surgical measures do not adequately control symptoms, surgery will be considered in the future. The patient agrees to discuss this again at follow-up, if clinically warranted.    Medications:  Continue OTC Tylenol and NSAIDs as needed for pain management.   Procedures:  Viscosupplement  injection requested and performed today to the left knee with 20mg/2mL of Euflexxa. See procedure note. Standard precautions provided.   Pain Management: Ice compress to the affected area 2-3x a day for 15-20 minutes as needed for pain management.  Other:  Advised to avoid strenuous activities for the next 24-48 hours. After 48 hours, resume activities as tolerated.     Follow-Up:  Follow up for left knee In 3 months with Dr. Rogel.           I explained the nature of the problem to the patient.     I discussed at length with the patient all the different treatment options available for her left shoulder including anti-inflammatories, acetaminophen, rest, ice, heat, physical therapy, and corticosteroid injections. I explained the potential role of surgery in the treatment of this condition. The patient understands that if non-surgical measures do not adequately control symptoms, surgery will be considered in the future.       HEP:  37298 - Nubia Gonzalez PA-C instructed and demonstrated a Shoulder strengthening & ROM HEP. The patient then demonstrated understanding of exercises and proper technique. This program was performed for 15 minutes.   Procedures: Corticosteroid injection requested and performed today to the left shoulder. See procedure note. Standard precautions provided.  Pain Management: Ice compress to the affected area 2-3x a day for 15-20 minutes as needed for pain management.      Follow-Up:  Follow up for left shoulder in 3 months     All of the patient's questions were answered and the patient will contact us if they have any questions or concerns in the interim.    Nubia Gonzalez PA-C  [facility]  Orthopedic Surgery    Medical Dictation software was used during the dictation of portions or the entirety of this medical record.  Phonetic or grammatic errors may exist due to the use of this software. For clarification, refer to the author of the document.                 [1]   Current  Outpatient Medications:     amlodipine (NORVASC) 5 MG tablet, Take 10 mg by mouth once daily. , Disp: , Rfl:     ARIPiprazole (ABILIFY) 15 MG Tab, Take 15 mg by mouth once daily., Disp: , Rfl:     aspirin (ECOTRIN) 81 MG EC tablet, Take 1 tablet (81 mg total) by mouth once daily., Disp: , Rfl: 0    buPROPion (WELLBUTRIN XL) 300 MG 24 hr tablet, Take 300 mg by mouth once daily., Disp: , Rfl:     citalopram (CELEXA) 10 MG tablet, Take 60 mg by mouth once daily. Takes a 40 and 20 mg, Disp: , Rfl:     clonazePAM (KLONOPIN) 1 MG tablet, Take 1 mg by mouth 3 (three) times daily as needed., Disp: , Rfl:     estradioL (ESTRACE) 2 MG tablet, Take 2 mg by mouth once daily., Disp: , Rfl:     folic acid (FOLVITE) 1 MG tablet, Take 1,000 mcg by mouth once daily., Disp: , Rfl:     gabapentin (NEURONTIN) 600 MG tablet, Take 600 mg by mouth 3 (three) times daily., Disp: , Rfl:     levothyroxine (SYNTHROID) 50 MCG tablet, Take 75 mcg by mouth once daily., Disp: , Rfl:     methylPREDNISolone (MEDROL DOSEPACK) 4 mg tablet, use as directed (Patient not taking: Reported on 7/9/2024), Disp: 21 each, Rfl: 0    NON FORMULARY MEDICATION, Apply 1 patch topically once daily. B12  D3/calcium Patch Multivitamin Patch  3 different patches, Disp: , Rfl:     omeprazole (PRILOSEC) 40 MG capsule, Take 40 mg by mouth once daily., Disp: , Rfl:     ondansetron (ZOFRAN-ODT) 4 MG TbDL, Take 1 tablet (4 mg total) by mouth every 6 (six) hours as needed (nausea)., Disp: 10 tablet, Rfl: 0    rosuvastatin (CRESTOR) 5 MG tablet, Take 5 mg by mouth once daily., Disp: , Rfl:     VITAMIN D2 1,250 mcg (50,000 unit) capsule, Take 50,000 Units by mouth every 7 days., Disp: , Rfl:

## 2025-05-09 NOTE — PROCEDURES
Large Joint Aspiration/Injection: L knee    Date/Time: 5/9/2025 3:00 PM    Performed by: Bonnie Gonzalez PA-C  Authorized by: Bonnie Gonzalez PA-C    Consent Done?:  Yes (Verbal)  Indications:  Arthritis and pain  Site marked: the procedure site was marked    Timeout: prior to procedure the correct patient, procedure, and site was verified      Local anesthesia used?: Yes    Local anesthetic:  Topical anesthetic    Details:  Needle Size:  22 G  Ultrasonic Guidance for needle placement?: No    Approach:  Anterolateral  Location:  Knee  Site:  L knee  Medications:  20 mg sodium hyaluronate (EUFLEXXA) 10 mg/mL(mw 2.4 -3.6 million)  Patient tolerance:  Patient tolerated the procedure well with no immediate complications    Injection Procedure Note   Prior to procedure the correct patient, procedure, and site was verified. Allergies were reviewed and verbal consent was obtained. The procedure site was marked.    After time out was performed, the patient was prepped aseptically with chloraprep, the area was sprayed with local topical anesthetic, and then cleaned with alcohol. A therapeutic injection of 20mg/2mL of Euflexxa was given under sterile technique using a 22-gauge x 1.5 needle from the anterolateral aspect of the left knee joint. Sterile dressing applied.    Patient tolerated the procedure well. Pain decreased. She had no adverse reactions to the medication. She was reminded to call the clinic immediately for any adverse side effects as explained in clinic today.     Advised patient to avoid strenuous activities for the next 24-48 hours and to apply ice for 20 minutes to help alleviate this this pain.

## 2025-05-28 ENCOUNTER — PATIENT MESSAGE (OUTPATIENT)
Dept: ORTHOPEDICS | Facility: CLINIC | Age: 55
End: 2025-05-28
Payer: COMMERCIAL

## 2025-06-30 ENCOUNTER — OFFICE VISIT (OUTPATIENT)
Dept: OBSTETRICS AND GYNECOLOGY | Facility: CLINIC | Age: 55
End: 2025-06-30
Payer: COMMERCIAL

## 2025-06-30 VITALS — DIASTOLIC BLOOD PRESSURE: 69 MMHG | SYSTOLIC BLOOD PRESSURE: 91 MMHG | BODY MASS INDEX: 53.17 KG/M2 | WEIGHT: 293 LBS

## 2025-06-30 DIAGNOSIS — L98.491 SKIN ULCER, LIMITED TO BREAKDOWN OF SKIN: Primary | ICD-10-CM

## 2025-06-30 PROCEDURE — 99999 PR PBB SHADOW E&M-EST. PATIENT-LVL III: CPT | Mod: PBBFAC,,,

## 2025-06-30 PROCEDURE — 99203 OFFICE O/P NEW LOW 30 MIN: CPT | Mod: S$GLB,,,

## 2025-06-30 PROCEDURE — 1159F MED LIST DOCD IN RCRD: CPT | Mod: CPTII,S$GLB,,

## 2025-06-30 PROCEDURE — 3074F SYST BP LT 130 MM HG: CPT | Mod: CPTII,S$GLB,,

## 2025-06-30 PROCEDURE — 3008F BODY MASS INDEX DOCD: CPT | Mod: CPTII,S$GLB,,

## 2025-06-30 PROCEDURE — 3078F DIAST BP <80 MM HG: CPT | Mod: CPTII,S$GLB,,

## 2025-06-30 PROCEDURE — 1160F RVW MEDS BY RX/DR IN RCRD: CPT | Mod: CPTII,S$GLB,,

## 2025-06-30 RX ORDER — CLOTRIMAZOLE AND BETAMETHASONE DIPROPIONATE 10; .64 MG/G; MG/G
CREAM TOPICAL 2 TIMES DAILY
COMMUNITY

## 2025-06-30 NOTE — PROGRESS NOTES
SUBJECTIVE:   54 y.o. female  presents for painful irritated rash to bilateral inner buttocks which onset about 1 year ago. Symptoms occur intermittently, fluctuating in severity. Denies any fever, chills, drainage, vaginal discharge, vaginal pain, odor, itching. She has not visualized the rash herself, but she feels the discomfort. She has tried monistat, Lotrisone cream twice daily with no improvement. She was recently on a Z-pack 2 weeks ago for URI, and reports no improvement of rash on antibiotics. She has tried nystatin powder with no improvement. This is affecting her daily life as it rubs against her underwear and clothing and causes pain. No changes in detergent. She has been using body wash-dove sensitive skin. She does take frequent baths, but always has. She sleeps with no underwear at night. She does not have diabetes.   No LMP recorded. Patient has had a hysterectomy. S/p Total hysterectomy in .         Past Medical History:   Diagnosis Date    Anxiety disorder, unspecified     Arthritis     Depression     High cholesterol     Hypertension     Hypothyroidism, unspecified     Neuropathy     Panic attack     occasional    Sleep apnea     does not use prescribed machine     Past Surgical History:   Procedure Laterality Date    BARIATRIC SURGERY      gastric sleeve    BILATERAL TUBAL LIGATION      CERVICAL SPINE SURGERY      prosthetic disc in neck (C6-7)     SECTION  1990    x1    CHOLECYSTECTOMY      HYSTERECTOMY      KNEE ARTHROPLASTY Right 2023    Procedure: ARTHROPLASTY, KNEE;  Surgeon: Jose Rogel MD;  Location: [facility];  Service: Orthopedics;  Laterality: Right;     Social History[1]  No family history on file.  OB History    Para Term  AB Living   2 2 2   2   SAB IAB Ectopic Multiple Live Births             # Outcome Date GA Lbr Danny/2nd Weight Sex Type Anes PTL Lv   2 Term            1 Term                  Current Medications[2]  Allergies:  Penicillins, Nsaids (non-steroidal anti-inflammatory drug), Oxycodone-acetaminophen, and Penicillin g sodium     ROS:  See HPI      OBJECTIVE:   The patient appears well, alert, oriented x 3, in no distress.  BP 91/69 (Patient Position: Sitting)   Wt (!) 154 kg (339 lb 8.1 oz)   BMI 53.17 kg/m²   NECK: no thyromegaly, trachea midline  SKIN: no acne, striae, hirsutism  BREAST EXAM: not examined  ABDOMEN: no hernias, masses, or hepatosplenomegaly  GENITALIA: normal external genitalia, no erythema, no discharge  BUTTOCKS: there are two ulcers to bilateral inner buttocks near vulva. Right ulcer is linear about 2 cm x 0.3 cm. Left ulcer is linear about 3 cm x1 cm. No surrounding erythema, swelling. No drainage. No signs of cellulitis.   URETHRA: normal urethra, normal urethral meatus  PELVIC: deferred.      ASSESSMENT:   Diagnoses and all orders for this visit:    Skin ulcer, limited to breakdown of skin        No orders of the defined types were placed in this encounter.    I do not believe it is a yeast, bacterial, or herpes infection.   Advised to keep areas clean, dry. Use bandages during the day to avoid further friction, rubbing, trauma. Use triple antibiotic ointment with lidocaine in the mornings. Make sure area is dry prior to sleeping. Do not use underwear to bed. Use zinc oxide to prevent further skin breakdown from friction.    RTC in 2 weeks.   Follow up in 1 year for annual exam or as needed.  Luh Hinton PA-C         [1]   Social History  Socioeconomic History    Marital status:    Tobacco Use    Smoking status: Never    Smokeless tobacco: Never   Substance and Sexual Activity    Alcohol use: No    Drug use: No    Sexual activity: Not Currently     Social Drivers of Health     Financial Resource Strain: Medium Risk (6/29/2025)    Received from [facility]    Overall Financial Resource Strain (CARDIA)     How hard is it for you to pay for the very basics like food, housing, medical care, and  heating?: Somewhat hard   Food Insecurity: Food Insecurity Present (6/29/2025)    Received from [facility]    Hunger Vital Sign     Worried About Running Out of Food in the Last Year: Sometimes true     Ran Out of Food in the Last Year: Sometimes true   Transportation Needs: No Transportation Needs (6/29/2025)    Received from [facility]    PRAPARE - Transportation     In the past 12 months, has lack of transportation kept you from medical appointments or from getting medications?: No     In the past 12 months, has lack of transportation kept you from meetings, work, or from getting things needed for daily living?: No   Physical Activity: Inactive (6/29/2025)    Received from [facility]    Exercise Vital Sign     Days of Exercise per Week: 0 days     Minutes of Exercise per Session: 0 min   Stress: Stress Concern Present (6/29/2025)    Received from [facility]    [employer] [place] [employer] - Occupational Stress Questionnaire     Do you feel stress - tense, restless, nervous, or anxious, or unable to sleep at night because your mind is troubled all the time - these days?: Very much   Housing Stability: High Risk (6/29/2025)    Received from [facility]    Housing Stability Vital Sign     Unable to Pay for Housing in the Last Year: Yes   [2]   Current Outpatient Medications   Medication Sig Dispense Refill    amlodipine (NORVASC) 5 MG tablet Take 10 mg by mouth once daily.       ARIPiprazole (ABILIFY) 15 MG Tab Take 15 mg by mouth once daily.      buPROPion (WELLBUTRIN XL) 300 MG 24 hr tablet Take 300 mg by mouth once daily.      citalopram (CELEXA) 10 MG tablet Take 60 mg by mouth once daily. Takes a 40 and 20 mg      clotrimazole-betamethasone 1-0.05% (LOTRISONE) cream Apply topically 2 (two) times daily.      estradioL (ESTRACE) 2 MG tablet Take 2 mg by mouth once daily.      folic acid (FOLVITE) 1 MG tablet Take 1,000 mcg by mouth once daily.      levothyroxine (SYNTHROID) 50 MCG tablet Take 75  mcg by mouth once daily.      methylPREDNISolone (MEDROL DOSEPACK) 4 mg tablet use as directed 21 each 0    NON FORMULARY MEDICATION Apply 1 patch topically once daily. B12   D3/calcium Patch  Multivitamin Patch    3 different patches      omeprazole (PRILOSEC) 40 MG capsule Take 40 mg by mouth once daily.      ondansetron (ZOFRAN-ODT) 4 MG TbDL Take 1 tablet (4 mg total) by mouth every 6 (six) hours as needed (nausea). 10 tablet 0    rosuvastatin (CRESTOR) 5 MG tablet Take 5 mg by mouth once daily.      VITAMIN D2 1,250 mcg (50,000 unit) capsule Take 50,000 Units by mouth every 7 days.      aspirin (ECOTRIN) 81 MG EC tablet Take 1 tablet (81 mg total) by mouth once daily.  0    clonazePAM (KLONOPIN) 1 MG tablet Take 1 mg by mouth 3 (three) times daily as needed.      gabapentin (NEURONTIN) 600 MG tablet Take 600 mg by mouth 3 (three) times daily.       No current facility-administered medications for this visit.

## 2025-07-16 ENCOUNTER — PATIENT MESSAGE (OUTPATIENT)
Dept: ORTHOPEDICS | Facility: CLINIC | Age: 55
End: 2025-07-16
Payer: COMMERCIAL

## 2025-07-26 ENCOUNTER — HOSPITAL ENCOUNTER (EMERGENCY)
Facility: HOSPITAL | Age: 55
Discharge: HOME | End: 2025-07-26
Attending: STUDENT IN AN ORGANIZED HEALTH CARE EDUCATION/TRAINING PROGRAM
Payer: COMMERCIAL

## 2025-07-26 VITALS
WEIGHT: 293 LBS | DIASTOLIC BLOOD PRESSURE: 81 MMHG | SYSTOLIC BLOOD PRESSURE: 172 MMHG | HEART RATE: 90 BPM | OXYGEN SATURATION: 97 % | BODY MASS INDEX: 45.99 KG/M2 | HEIGHT: 67 IN | TEMPERATURE: 98 F | RESPIRATION RATE: 20 BRPM

## 2025-07-26 DIAGNOSIS — J21.9 BRONCHIOLITIS: Primary | ICD-10-CM

## 2025-07-26 DIAGNOSIS — R05.9 COUGH: ICD-10-CM

## 2025-07-26 PROCEDURE — 99284 EMERGENCY DEPT VISIT MOD MDM: CPT | Mod: 25

## 2025-07-26 PROCEDURE — 25000242 PHARM REV CODE 250 ALT 637 W/ HCPCS: Performed by: STUDENT IN AN ORGANIZED HEALTH CARE EDUCATION/TRAINING PROGRAM

## 2025-07-26 PROCEDURE — 94640 AIRWAY INHALATION TREATMENT: CPT

## 2025-07-26 RX ORDER — PREDNISONE 20 MG/1
40 TABLET ORAL DAILY
Qty: 6 TABLET | Refills: 0 | Status: SHIPPED | OUTPATIENT
Start: 2025-07-26 | End: 2025-07-29

## 2025-07-26 RX ORDER — IPRATROPIUM BROMIDE AND ALBUTEROL SULFATE 2.5; .5 MG/3ML; MG/3ML
6 SOLUTION RESPIRATORY (INHALATION) ONCE
Status: COMPLETED | OUTPATIENT
Start: 2025-07-26 | End: 2025-07-26

## 2025-07-26 RX ORDER — ALBUTEROL SULFATE 2.5 MG/.5ML
2.5 SOLUTION RESPIRATORY (INHALATION) EVERY 4 HOURS PRN
Qty: 25 EACH | Refills: 0 | Status: SHIPPED | OUTPATIENT
Start: 2025-07-26 | End: 2025-08-09

## 2025-07-26 RX ADMIN — IPRATROPIUM BROMIDE AND ALBUTEROL SULFATE 6 ML: .5; 3 SOLUTION RESPIRATORY (INHALATION) at 09:07

## 2025-07-27 NOTE — ED PROVIDER NOTES
"Encounter Date: 2025       History     Chief Complaint   Patient presents with    Cough     Pt stated she started vaping marijuana 2 weeks ago.  Stated she has developed a chronic cough after the vaping.  Stated her family wanted her to come to the ER.  She is worried she has "popcorn lung"     HPI  54-year-old female, medical history as listed below, presents brought in by self through triage for cough, ongoing for approximately the last month.  Denies acute change in the last 24 hours but states I just can not take it anymore .  No interventions at this time.  History of DOROTHY and has been given but does not use/refuses to use CPAP.  States that shortly before this started she had started using prescribed THC vapes for the 1st time.  Review of patient's allergies indicates:   Allergen Reactions    Penicillins Other (See Comments)     Causes throat swelling    Nsaids (non-steroidal anti-inflammatory drug)      Can't not take due to bariatric surgery     Oxycodone-acetaminophen     Penicillin g sodium      Past Medical History:   Diagnosis Date    Anxiety disorder, unspecified     Arthritis     Depression     High cholesterol     Hypertension     Hypothyroidism, unspecified     Neuropathy     Panic attack     occasional    Sleep apnea     does not use prescribed machine     Past Surgical History:   Procedure Laterality Date    BARIATRIC SURGERY      gastric sleeve    BILATERAL TUBAL LIGATION      CERVICAL SPINE SURGERY      prosthetic disc in neck (C6-7)     SECTION  1990    x1    CHOLECYSTECTOMY      HYSTERECTOMY      KNEE ARTHROPLASTY Right 2023    Procedure: ARTHROPLASTY, KNEE;  Surgeon: Jose Rogel MD;  Location: [facility];  Service: Orthopedics;  Laterality: Right;     No family history on file.  Social History[1]  Medical surgical family and social history reviewed  Review of Systems  Complete review of systems was conducted and was negative except as per HPI and as " marked  Physical Exam     Initial Vitals [07/26/25 2033]   BP Pulse Resp Temp SpO2   (!) 172/81 80 18 98.3 °F (36.8 °C) 97 %      MAP       --         Physical Exam  Physical  General: Awake, alert, no acute distress  Head: Normocephalic, atraumatic  Neck: Trachea midline, full range of motion, no meningismus  ENT: Atraumatic, Airway Patent  Cardio: Regular Rate, Regular Rhythm, Heart Sounds Normal, Capillary refill normal  Chest: Atraumatic, No respiratory distress no use of accessory muscles to breath, normal rate, sounds even and clear to auscultation  Abdomen: Soft, Nontender, no involuntary guarding, rigidity, or rebound.  Upper Ext: Atraumatic, Inspection normal, no swelling  Lower Ext: Atraumatic, Inspection normal, no swelling  Neuro: No gross cranial nerve abnormality, no lateralization, no gross sensory or motor deficits  ED Course   Procedures  Labs Reviewed - No data to display       Imaging Results              X-Ray Chest PA And Lateral (Final result)  Result time 07/26/25 22:39:20      Final result by Bertin Roberts DO (07/26/25 22:39:20)                   Impression:      No acute abnormality.      Electronically signed by: Bertin Roberts  Date:    07/26/2025  Time:    22:39               Narrative:    EXAMINATION:  XR CHEST PA AND LATERAL    CLINICAL HISTORY:  Cough, unspecified    TECHNIQUE:  PA and lateral views of the chest were performed.    COMPARISON:  None    FINDINGS:  The lungs are well expanded and clear. No focal opacities are seen. The pleural spaces are clear. The cardiac silhouette is unremarkable. The visualized osseous structures demonstrate degenerative changes.  There are surgical clips in the upper abdomen.                                       Medications   albuterol-ipratropium 2.5 mg-0.5 mg/3 mL nebulizer solution 6 mL (6 mLs Nebulization Given 7/26/25 2132)     Medical Decision Making  Amount and/or Complexity of Data Reviewed  Radiology: ordered.    Risk  Prescription drug  management.                    54-year-old female, medical history as listed below, presents brought in by self through triage for cough, ongoing for approximately the last month.  Denies acute change in the last 24 hours but states I just can not take it anymore .  No interventions at this time.  History of DOROTHY and has been given but does not use/refuses to use CPAP.  States that shortly before this started she had started using prescribed THC vapes for the 1st time.    Differential initially included pneumothorax, pneumonitis, pneumonia, which were excluded on the basis of x-ray.    Nebs given for treatment of bronchitis/bronchiolitis, after which he feels much better and wants to go home.  Counseled to avoid vaping in the future as this is likely exacerbating underlying so thumbs.   Diagnosis, use of prescription medications, need for follow-up regarding visit today, need to call for follow-up, expected course, and return precautions were all discussed at length in my traditional manner to which patient verbalized understanding and agrees and all questions were answered. Patient is well appearing and ambulatory at time of discharge.  Patient seen in the Emergency department, which included consideration and evaluation for life and limb threatening medical conditions including the history, exam, timely review and interpretation of studies.   All labs and imaging ordered for this encountered were reviewed and included in medical decision making.   Additional information for this case was obtained from discussion with:  Daughter present at bedside       Clinical Impression:  Final diagnoses:  [R05.9] Cough  [J21.9] Bronchiolitis (Primary)          ED Disposition Condition    Discharge Stable          ED Prescriptions       Medication Sig Dispense Start Date End Date Auth. Provider    albuterol sulfate 2.5 mg/0.5 mL Nebu Take 2.5 mg by nebulization every 4 (four) hours as needed (wheezing). Rescue 25 each 7/26/2025  8/9/2025 Alejandro Pool MD    predniSONE (DELTASONE) 20 MG tablet Take 2 tablets (40 mg total) by mouth once daily. for 3 days 6 tablet 7/26/2025 7/29/2025 Alejandro Pool MD          Follow-up Information    None                [1]   Social History  Tobacco Use    Smoking status: Never    Smokeless tobacco: Never   Substance Use Topics    Alcohol use: No    Drug use: No        Alejandro Pool MD  07/27/25 0224

## 2025-07-27 NOTE — ED NOTES
Pt reports dry, nonproductive cough x 1 month. Pt states she started using medical marijuana vape pen for chronic pain at that time. Pt states brown discharge from nose when blown and will wake from sleeping with brown mucous on face. No history of smoking prior to use of vape pen.

## 2025-07-27 NOTE — DISCHARGE INSTRUCTIONS
"Follow up with your primary care doctor as needed.  Use medications as prescribed, and specifically for the next 2 days I recommend that every 4 hours who either do 2 puffs on the inhaler with a spacer, or a nebulizer treatment.    If your insurance does not cover the nebulizer, this can be purchased online on [employer] for about 50 dollars.  If it does not cover the spacer nearly any sort of plastic or cardboard tube and can be used for similar.    I recommend avoiding any sort of vaping, as the chemicals in this can exacerbate spasms of the pathways that lay down to the lungs and caused these sort of symptoms.    Your chest x-ray is clear and does not show any evidence of pneumonia or popcorn lung"  "